# Patient Record
Sex: FEMALE | Race: WHITE | NOT HISPANIC OR LATINO | Employment: OTHER | URBAN - METROPOLITAN AREA
[De-identification: names, ages, dates, MRNs, and addresses within clinical notes are randomized per-mention and may not be internally consistent; named-entity substitution may affect disease eponyms.]

---

## 2019-03-01 ENCOUNTER — APPOINTMENT (OUTPATIENT)
Dept: RADIOLOGY | Facility: MEDICAL CENTER | Age: 74
DRG: 871 | End: 2019-03-01
Attending: STUDENT IN AN ORGANIZED HEALTH CARE EDUCATION/TRAINING PROGRAM
Payer: COMMERCIAL

## 2019-03-01 ENCOUNTER — HOSPITAL ENCOUNTER (INPATIENT)
Facility: MEDICAL CENTER | Age: 74
LOS: 3 days | DRG: 871 | End: 2019-03-04
Attending: EMERGENCY MEDICINE | Admitting: INTERNAL MEDICINE
Payer: COMMERCIAL

## 2019-03-01 ENCOUNTER — HOSPITAL ENCOUNTER (OUTPATIENT)
Dept: RADIOLOGY | Facility: MEDICAL CENTER | Age: 74
End: 2019-03-01

## 2019-03-01 DIAGNOSIS — R19.7 NAUSEA VOMITING AND DIARRHEA: ICD-10-CM

## 2019-03-01 DIAGNOSIS — R11.2 NAUSEA VOMITING AND DIARRHEA: ICD-10-CM

## 2019-03-01 DIAGNOSIS — D72.829 LEUKOCYTOSIS, UNSPECIFIED TYPE: ICD-10-CM

## 2019-03-01 PROBLEM — I10 HYPERTENSION: Status: ACTIVE | Noted: 2019-03-01

## 2019-03-01 PROBLEM — F31.9 BIPOLAR DISORDER (HCC): Status: ACTIVE | Noted: 2019-03-01

## 2019-03-01 PROBLEM — N17.9 ACUTE KIDNEY INJURY (HCC): Status: ACTIVE | Noted: 2019-03-01

## 2019-03-01 PROBLEM — E03.9 HYPOTHYROIDISM: Status: ACTIVE | Noted: 2019-03-01

## 2019-03-01 PROBLEM — C80.1 MALIGNANCY (HCC): Status: ACTIVE | Noted: 2019-03-01

## 2019-03-01 PROBLEM — K52.839 MICROSCOPIC COLITIS: Status: ACTIVE | Noted: 2019-03-01

## 2019-03-01 PROBLEM — E83.52 HYPERCALCEMIA: Status: ACTIVE | Noted: 2019-03-01

## 2019-03-01 PROBLEM — J18.9 PNEUMONIA: Status: ACTIVE | Noted: 2019-03-01

## 2019-03-01 LAB
ALBUMIN SERPL BCP-MCNC: 2.8 G/DL (ref 3.2–4.9)
ALBUMIN/GLOB SERPL: 1.6 G/DL
ALP SERPL-CCNC: 67 U/L (ref 30–99)
ALT SERPL-CCNC: 11 U/L (ref 2–50)
ANION GAP SERPL CALC-SCNC: 8 MMOL/L (ref 0–11.9)
AST SERPL-CCNC: 38 U/L (ref 12–45)
BILIRUB SERPL-MCNC: 1.3 MG/DL (ref 0.1–1.5)
BUN SERPL-MCNC: 17 MG/DL (ref 8–22)
CALCIUM SERPL-MCNC: 7.3 MG/DL (ref 8.5–10.5)
CHLORIDE SERPL-SCNC: 120 MMOL/L (ref 96–112)
CO2 SERPL-SCNC: 10 MMOL/L (ref 20–33)
CREAT SERPL-MCNC: 0.75 MG/DL (ref 0.5–1.4)
GLOBULIN SER CALC-MCNC: 1.7 G/DL (ref 1.9–3.5)
GLUCOSE SERPL-MCNC: 101 MG/DL (ref 65–99)
LACTATE BLD-SCNC: 1.3 MMOL/L (ref 0.5–2)
POTASSIUM SERPL-SCNC: 3.2 MMOL/L (ref 3.6–5.5)
PROT SERPL-MCNC: 4.5 G/DL (ref 6–8.2)
SODIUM SERPL-SCNC: 138 MMOL/L (ref 135–145)

## 2019-03-01 PROCEDURE — 87070 CULTURE OTHR SPECIMN AEROBIC: CPT

## 2019-03-01 PROCEDURE — 36415 COLL VENOUS BLD VENIPUNCTURE: CPT

## 2019-03-01 PROCEDURE — 80053 COMPREHEN METABOLIC PANEL: CPT

## 2019-03-01 PROCEDURE — 700105 HCHG RX REV CODE 258: Performed by: STUDENT IN AN ORGANIZED HEALTH CARE EDUCATION/TRAINING PROGRAM

## 2019-03-01 PROCEDURE — 99285 EMERGENCY DEPT VISIT HI MDM: CPT

## 2019-03-01 PROCEDURE — 85730 THROMBOPLASTIN TIME PARTIAL: CPT

## 2019-03-01 PROCEDURE — A9270 NON-COVERED ITEM OR SERVICE: HCPCS | Performed by: STUDENT IN AN ORGANIZED HEALTH CARE EDUCATION/TRAINING PROGRAM

## 2019-03-01 PROCEDURE — 85610 PROTHROMBIN TIME: CPT

## 2019-03-01 PROCEDURE — 770020 HCHG ROOM/CARE - TELE (206)

## 2019-03-01 PROCEDURE — 87205 SMEAR GRAM STAIN: CPT

## 2019-03-01 PROCEDURE — A9270 NON-COVERED ITEM OR SERVICE: HCPCS | Performed by: EMERGENCY MEDICINE

## 2019-03-01 PROCEDURE — 700102 HCHG RX REV CODE 250 W/ 637 OVERRIDE(OP): Performed by: EMERGENCY MEDICINE

## 2019-03-01 PROCEDURE — 700102 HCHG RX REV CODE 250 W/ 637 OVERRIDE(OP): Performed by: STUDENT IN AN ORGANIZED HEALTH CARE EDUCATION/TRAINING PROGRAM

## 2019-03-01 PROCEDURE — 83605 ASSAY OF LACTIC ACID: CPT | Mod: 91

## 2019-03-01 PROCEDURE — 71250 CT THORAX DX C-: CPT

## 2019-03-01 RX ORDER — HYDRALAZINE HYDROCHLORIDE 20 MG/ML
10 INJECTION INTRAMUSCULAR; INTRAVENOUS EVERY 4 HOURS PRN
Status: DISCONTINUED | OUTPATIENT
Start: 2019-03-01 | End: 2019-03-04 | Stop reason: HOSPADM

## 2019-03-01 RX ORDER — BISACODYL 10 MG
10 SUPPOSITORY, RECTAL RECTAL
Status: DISCONTINUED | OUTPATIENT
Start: 2019-03-01 | End: 2019-03-04 | Stop reason: HOSPADM

## 2019-03-01 RX ORDER — SODIUM CHLORIDE 9 MG/ML
30 INJECTION, SOLUTION INTRAVENOUS
Status: DISCONTINUED | OUTPATIENT
Start: 2019-03-01 | End: 2019-03-04 | Stop reason: HOSPADM

## 2019-03-01 RX ORDER — PREGABALIN 75 MG/1
75 CAPSULE ORAL 2 TIMES DAILY
Status: DISCONTINUED | OUTPATIENT
Start: 2019-03-01 | End: 2019-03-04 | Stop reason: HOSPADM

## 2019-03-01 RX ORDER — AMOXICILLIN 250 MG
2 CAPSULE ORAL 2 TIMES DAILY
Status: DISCONTINUED | OUTPATIENT
Start: 2019-03-01 | End: 2019-03-04 | Stop reason: HOSPADM

## 2019-03-01 RX ORDER — DIPHENOXYLATE HYDROCHLORIDE AND ATROPINE SULFATE 2.5; .025 MG/1; MG/1
1 TABLET ORAL 2 TIMES DAILY
COMMUNITY

## 2019-03-01 RX ORDER — QUETIAPINE FUMARATE 25 MG/1
12.5 TABLET, FILM COATED ORAL DAILY
COMMUNITY

## 2019-03-01 RX ORDER — ZOLPIDEM TARTRATE 5 MG/1
10 TABLET ORAL NIGHTLY PRN
Status: DISCONTINUED | OUTPATIENT
Start: 2019-03-01 | End: 2019-03-04 | Stop reason: HOSPADM

## 2019-03-01 RX ORDER — LAMOTRIGINE 200 MG/1
200 TABLET ORAL 2 TIMES DAILY
COMMUNITY

## 2019-03-01 RX ORDER — LITHIUM CARBONATE 450 MG
450 TABLET, EXTENDED RELEASE ORAL
Status: DISCONTINUED | OUTPATIENT
Start: 2019-03-01 | End: 2019-03-04 | Stop reason: HOSPADM

## 2019-03-01 RX ORDER — LEVOTHYROXINE SODIUM 0.03 MG/1
25 TABLET ORAL
COMMUNITY

## 2019-03-01 RX ORDER — AZITHROMYCIN 250 MG/1
500 TABLET, FILM COATED ORAL DAILY
Status: DISCONTINUED | OUTPATIENT
Start: 2019-03-01 | End: 2019-03-04 | Stop reason: HOSPADM

## 2019-03-01 RX ORDER — SODIUM CHLORIDE 9 MG/ML
500 INJECTION, SOLUTION INTRAVENOUS
Status: DISCONTINUED | OUTPATIENT
Start: 2019-03-01 | End: 2019-03-04 | Stop reason: HOSPADM

## 2019-03-01 RX ORDER — LITHIUM CARBONATE 450 MG
450 TABLET, EXTENDED RELEASE ORAL
COMMUNITY

## 2019-03-01 RX ORDER — ACETAMINOPHEN 325 MG/1
650 TABLET ORAL ONCE
Status: COMPLETED | OUTPATIENT
Start: 2019-03-01 | End: 2019-03-01

## 2019-03-01 RX ORDER — LEVOTHYROXINE SODIUM 0.03 MG/1
25 TABLET ORAL
Status: DISCONTINUED | OUTPATIENT
Start: 2019-03-02 | End: 2019-03-04 | Stop reason: HOSPADM

## 2019-03-01 RX ORDER — POLYETHYLENE GLYCOL 3350 17 G/17G
1 POWDER, FOR SOLUTION ORAL
Status: DISCONTINUED | OUTPATIENT
Start: 2019-03-01 | End: 2019-03-04 | Stop reason: HOSPADM

## 2019-03-01 RX ORDER — LAMOTRIGINE 100 MG/1
200 TABLET ORAL 2 TIMES DAILY
Status: DISCONTINUED | OUTPATIENT
Start: 2019-03-01 | End: 2019-03-04 | Stop reason: HOSPADM

## 2019-03-01 RX ORDER — ZOLPIDEM TARTRATE 12.5 MG/1
12.5 TABLET, FILM COATED, EXTENDED RELEASE ORAL NIGHTLY PRN
COMMUNITY

## 2019-03-01 RX ORDER — PREGABALIN 75 MG/1
75 CAPSULE ORAL 2 TIMES DAILY
COMMUNITY

## 2019-03-01 RX ORDER — GUAIFENESIN/DEXTROMETHORPHAN 100-10MG/5
10 SYRUP ORAL EVERY 6 HOURS PRN
Status: DISCONTINUED | OUTPATIENT
Start: 2019-03-01 | End: 2019-03-03

## 2019-03-01 RX ORDER — CLOBETASOL PROPIONATE 0.05 G/100ML
1 SHAMPOO TOPICAL
COMMUNITY

## 2019-03-01 RX ORDER — SODIUM CHLORIDE 9 MG/ML
INJECTION, SOLUTION INTRAVENOUS CONTINUOUS
Status: DISCONTINUED | OUTPATIENT
Start: 2019-03-01 | End: 2019-03-04

## 2019-03-01 RX ADMIN — AZITHROMYCIN 500 MG: 250 TABLET, FILM COATED ORAL at 21:40

## 2019-03-01 RX ADMIN — LITHIUM CARBONATE 450 MG: 450 TABLET, EXTENDED RELEASE ORAL at 21:00

## 2019-03-01 RX ADMIN — SODIUM CHLORIDE: 9 INJECTION, SOLUTION INTRAVENOUS at 19:45

## 2019-03-01 RX ADMIN — GUAIFENESIN AND DEXTROMETHORPHAN 10 ML: 100; 10 SYRUP ORAL at 21:35

## 2019-03-01 RX ADMIN — PREGABALIN 75 MG: 75 CAPSULE ORAL at 21:40

## 2019-03-01 RX ADMIN — LAMOTRIGINE 200 MG: 100 TABLET ORAL at 21:39

## 2019-03-01 RX ADMIN — ACETAMINOPHEN 650 MG: 325 TABLET, FILM COATED ORAL at 18:37

## 2019-03-01 ASSESSMENT — ENCOUNTER SYMPTOMS
HEARTBURN: 0
WEIGHT LOSS: 0
VOMITING: 1
PSYCHIATRIC NEGATIVE: 1
CARDIOVASCULAR NEGATIVE: 1
CONSTIPATION: 0
MUSCULOSKELETAL NEGATIVE: 1
NAUSEA: 1
FEVER: 0
WEAKNESS: 0
CHILLS: 1
EYES NEGATIVE: 1
BLOOD IN STOOL: 0
DIARRHEA: 1
NEUROLOGICAL NEGATIVE: 1
RESPIRATORY NEGATIVE: 1
DIAPHORESIS: 0
ABDOMINAL PAIN: 1

## 2019-03-01 ASSESSMENT — LIFESTYLE VARIABLES: DO YOU DRINK ALCOHOL: NO

## 2019-03-02 PROBLEM — R21 RASH: Status: ACTIVE | Noted: 2019-03-02

## 2019-03-02 PROBLEM — E87.20 METABOLIC ACIDOSIS: Status: ACTIVE | Noted: 2019-03-02

## 2019-03-02 PROBLEM — R53.81 DEBILITY: Status: ACTIVE | Noted: 2019-03-02

## 2019-03-02 PROBLEM — E87.6 HYPOKALEMIA: Status: ACTIVE | Noted: 2019-03-02

## 2019-03-02 LAB
APPEARANCE UR: CLEAR
APTT PPP: 29.9 SEC (ref 24.7–36)
BACTERIA #/AREA URNS HPF: NEGATIVE /HPF
BASOPHILS # BLD AUTO: 0.2 % (ref 0–1.8)
BASOPHILS # BLD: 0.07 K/UL (ref 0–0.12)
BILIRUB UR QL STRIP.AUTO: NEGATIVE
COLOR UR: YELLOW
EOSINOPHIL # BLD AUTO: 0.23 K/UL (ref 0–0.51)
EOSINOPHIL NFR BLD: 0.8 % (ref 0–6.9)
EPI CELLS #/AREA URNS HPF: ABNORMAL /HPF
ERYTHROCYTE [DISTWIDTH] IN BLOOD BY AUTOMATED COUNT: 45.1 FL (ref 35.9–50)
FLUAV RNA SPEC QL NAA+PROBE: NEGATIVE
FLUBV RNA SPEC QL NAA+PROBE: NEGATIVE
GLUCOSE UR STRIP.AUTO-MCNC: NEGATIVE MG/DL
GRAM STN SPEC: NORMAL
HCT VFR BLD AUTO: 42.1 % (ref 37–47)
HGB BLD-MCNC: 13.4 G/DL (ref 12–16)
HYALINE CASTS #/AREA URNS LPF: ABNORMAL /LPF
IMM GRANULOCYTES # BLD AUTO: 0.22 K/UL (ref 0–0.11)
IMM GRANULOCYTES NFR BLD AUTO: 0.8 % (ref 0–0.9)
INR PPP: 1.49 (ref 0.87–1.13)
KETONES UR STRIP.AUTO-MCNC: NEGATIVE MG/DL
LACTATE BLD-SCNC: 1.6 MMOL/L (ref 0.5–2)
LDH SERPL L TO P-CCNC: 301 U/L (ref 107–266)
LEUKOCYTE ESTERASE UR QL STRIP.AUTO: ABNORMAL
LYMPHOCYTES # BLD AUTO: 2.12 K/UL (ref 1–4.8)
LYMPHOCYTES NFR BLD: 7.5 % (ref 22–41)
MCH RBC QN AUTO: 29.7 PG (ref 27–33)
MCHC RBC AUTO-ENTMCNC: 31.8 G/DL (ref 33.6–35)
MCV RBC AUTO: 93.3 FL (ref 81.4–97.8)
MICRO URNS: ABNORMAL
MONOCYTES # BLD AUTO: 1.12 K/UL (ref 0–0.85)
MONOCYTES NFR BLD AUTO: 4 % (ref 0–13.4)
MRSA DNA SPEC QL NAA+PROBE: NORMAL
NEUTROPHILS # BLD AUTO: 24.49 K/UL (ref 2–7.15)
NEUTROPHILS NFR BLD: 86.7 % (ref 44–72)
NITRITE UR QL STRIP.AUTO: NEGATIVE
NRBC # BLD AUTO: 0 K/UL
NRBC BLD-RTO: 0 /100 WBC
PH UR STRIP.AUTO: 6 [PH]
PLATELET # BLD AUTO: 244 K/UL (ref 164–446)
PMV BLD AUTO: 9.6 FL (ref 9–12.9)
PROT UR QL STRIP: NEGATIVE MG/DL
PROTHROMBIN TIME: 18.1 SEC (ref 12–14.6)
RBC # BLD AUTO: 4.51 M/UL (ref 4.2–5.4)
RBC # URNS HPF: ABNORMAL /HPF
RBC UR QL AUTO: NEGATIVE
SIGNIFICANT IND 70042: NORMAL
SIGNIFICANT IND 70042: NORMAL
SITE SITE: NORMAL
SITE SITE: NORMAL
SOURCE SOURCE: NORMAL
SOURCE SOURCE: NORMAL
SP GR UR STRIP.AUTO: 1.01
UROBILINOGEN UR STRIP.AUTO-MCNC: 0.2 MG/DL
WBC # BLD AUTO: 28.3 K/UL (ref 4.8–10.8)
WBC #/AREA URNS HPF: ABNORMAL /HPF

## 2019-03-02 PROCEDURE — 87502 INFLUENZA DNA AMP PROBE: CPT

## 2019-03-02 PROCEDURE — 83615 LACTATE (LD) (LDH) ENZYME: CPT

## 2019-03-02 PROCEDURE — 700111 HCHG RX REV CODE 636 W/ 250 OVERRIDE (IP): Performed by: STUDENT IN AN ORGANIZED HEALTH CARE EDUCATION/TRAINING PROGRAM

## 2019-03-02 PROCEDURE — 700102 HCHG RX REV CODE 250 W/ 637 OVERRIDE(OP): Performed by: STUDENT IN AN ORGANIZED HEALTH CARE EDUCATION/TRAINING PROGRAM

## 2019-03-02 PROCEDURE — 700105 HCHG RX REV CODE 258: Performed by: STUDENT IN AN ORGANIZED HEALTH CARE EDUCATION/TRAINING PROGRAM

## 2019-03-02 PROCEDURE — 85025 COMPLETE CBC W/AUTO DIFF WBC: CPT

## 2019-03-02 PROCEDURE — 99223 1ST HOSP IP/OBS HIGH 75: CPT | Mod: GC | Performed by: INTERNAL MEDICINE

## 2019-03-02 PROCEDURE — A9270 NON-COVERED ITEM OR SERVICE: HCPCS | Performed by: STUDENT IN AN ORGANIZED HEALTH CARE EDUCATION/TRAINING PROGRAM

## 2019-03-02 PROCEDURE — 87641 MR-STAPH DNA AMP PROBE: CPT

## 2019-03-02 PROCEDURE — 81001 URINALYSIS AUTO W/SCOPE: CPT

## 2019-03-02 PROCEDURE — 770020 HCHG ROOM/CARE - TELE (206)

## 2019-03-02 PROCEDURE — 36415 COLL VENOUS BLD VENIPUNCTURE: CPT

## 2019-03-02 RX ORDER — POTASSIUM CHLORIDE 20 MEQ/1
40 TABLET, EXTENDED RELEASE ORAL 2 TIMES DAILY
Status: COMPLETED | OUTPATIENT
Start: 2019-03-02 | End: 2019-03-04

## 2019-03-02 RX ORDER — CAPSAICIN 0.025 %
CREAM (GRAM) TOPICAL 3 TIMES DAILY PRN
Status: DISCONTINUED | OUTPATIENT
Start: 2019-03-02 | End: 2019-03-04 | Stop reason: HOSPADM

## 2019-03-02 RX ORDER — POTASSIUM CHLORIDE 20 MEQ/1
40 TABLET, EXTENDED RELEASE ORAL 2 TIMES DAILY
Status: DISCONTINUED | OUTPATIENT
Start: 2019-03-02 | End: 2019-03-02

## 2019-03-02 RX ORDER — LOPERAMIDE HYDROCHLORIDE 2 MG/1
2 CAPSULE ORAL 4 TIMES DAILY PRN
Status: DISCONTINUED | OUTPATIENT
Start: 2019-03-02 | End: 2019-03-04 | Stop reason: HOSPADM

## 2019-03-02 RX ADMIN — LITHIUM CARBONATE 450 MG: 450 TABLET, EXTENDED RELEASE ORAL at 21:00

## 2019-03-02 RX ADMIN — LAMOTRIGINE 200 MG: 100 TABLET ORAL at 06:30

## 2019-03-02 RX ADMIN — LAMOTRIGINE 200 MG: 100 TABLET ORAL at 17:52

## 2019-03-02 RX ADMIN — AZITHROMYCIN 500 MG: 250 TABLET, FILM COATED ORAL at 17:52

## 2019-03-02 RX ADMIN — GUAIFENESIN AND DEXTROMETHORPHAN 10 ML: 100; 10 SYRUP ORAL at 20:35

## 2019-03-02 RX ADMIN — GUAIFENESIN AND DEXTROMETHORPHAN 10 ML: 100; 10 SYRUP ORAL at 13:01

## 2019-03-02 RX ADMIN — SODIUM CHLORIDE: 9 INJECTION, SOLUTION INTRAVENOUS at 18:01

## 2019-03-02 RX ADMIN — CAPSAICIN: 0.25 CREAM TOPICAL at 22:25

## 2019-03-02 RX ADMIN — LEVOTHYROXINE SODIUM 25 MCG: 25 TABLET ORAL at 06:30

## 2019-03-02 RX ADMIN — GUAIFENESIN AND DEXTROMETHORPHAN 10 ML: 100; 10 SYRUP ORAL at 06:31

## 2019-03-02 RX ADMIN — PREGABALIN 75 MG: 75 CAPSULE ORAL at 17:52

## 2019-03-02 RX ADMIN — ENOXAPARIN SODIUM 40 MG: 100 INJECTION SUBCUTANEOUS at 17:52

## 2019-03-02 RX ADMIN — CHOLECALCIFEROL (VITAMIN D3) 10 MCG (400 UNIT) TABLET 400 UNITS: at 07:52

## 2019-03-02 RX ADMIN — POTASSIUM CHLORIDE 40 MEQ: 1500 TABLET, EXTENDED RELEASE ORAL at 17:52

## 2019-03-02 RX ADMIN — SODIUM CHLORIDE: 9 INJECTION, SOLUTION INTRAVENOUS at 06:35

## 2019-03-02 RX ADMIN — CEFTRIAXONE SODIUM 1 G: 1 INJECTION, POWDER, FOR SOLUTION INTRAMUSCULAR; INTRAVENOUS at 06:32

## 2019-03-02 RX ADMIN — PREGABALIN 75 MG: 75 CAPSULE ORAL at 06:31

## 2019-03-02 ASSESSMENT — ENCOUNTER SYMPTOMS
FEVER: 0
PSYCHIATRIC NEGATIVE: 1
SPUTUM PRODUCTION: 1
WEAKNESS: 1
VOMITING: 0
HEMOPTYSIS: 0
CHILLS: 0
NAUSEA: 0
MUSCULOSKELETAL NEGATIVE: 1
CONSTIPATION: 0
WEIGHT LOSS: 0
HEARTBURN: 0
CARDIOVASCULAR NEGATIVE: 1
WHEEZING: 0
SHORTNESS OF BREATH: 1
BLOOD IN STOOL: 0
DIAPHORESIS: 0
DIARRHEA: 1
COUGH: 1

## 2019-03-02 ASSESSMENT — COGNITIVE AND FUNCTIONAL STATUS - GENERAL
MOBILITY SCORE: 24
SUGGESTED CMS G CODE MODIFIER DAILY ACTIVITY: CH
SUGGESTED CMS G CODE MODIFIER MOBILITY: CH
DAILY ACTIVITIY SCORE: 24

## 2019-03-02 ASSESSMENT — PATIENT HEALTH QUESTIONNAIRE - PHQ9
SUM OF ALL RESPONSES TO PHQ9 QUESTIONS 1 AND 2: 0
1. LITTLE INTEREST OR PLEASURE IN DOING THINGS: NOT AT ALL
2. FEELING DOWN, DEPRESSED, IRRITABLE, OR HOPELESS: NOT AT ALL

## 2019-03-02 ASSESSMENT — LIFESTYLE VARIABLES: EVER_SMOKED: NEVER

## 2019-03-02 NOTE — CARE PLAN
Problem: Knowledge Deficit  Goal: Knowledge of disease process/condition, treatment plan, diagnostic tests, and medications will improve    Intervention: Explain information regarding disease process/condition, treatment plan, diagnostic tests, and medications and document in education  Pt educated to current POC and verbalized understanding.       Problem: Respiratory:  Goal: Respiratory status will improve    Intervention: Administer and titrate oxygen therapy  2L. Base line room air.  Intervention: Educate and encourage coughing and deep breathing  Complete

## 2019-03-02 NOTE — ASSESSMENT & PLAN NOTE
Patient with hx of Microscopic colitis  C.difficile negative  ( [facility] )  Digital rectal exam at bedside negative for occult blood , FOBT -ve  On discharge, diarrhea at baseline  Stool studies pending

## 2019-03-02 NOTE — ED PROVIDER NOTES
"ED Provider Note    Scribed for Matt Langston M.D. by Andrey Lobo. 3/1/2019  5:29 PM    Primary care provider: None noted  Means of arrival: EMS  History obtained from: Patient  History limited by: Poor historian.    CHIEF COMPLAINT  Chief Complaint   Patient presents with   • Nausea/Vomiting/Diarrhea       HPI  Sherine Hahn is a 73 y.o. female who presents to the Emergency Department complaining of diarrhea. Patient states she was feeling generally \"flu-like\" with \"horribly runny\" diarrhea. Per nursing note, patient reported having nausea, vomiting and diarrhea over the last week. In contrast to nursing note, however, patient denies having any abdominal pain or vomiting. She states she is primarily having issues with diarrhea. She reports having intermittent chills, sweats, slight nausea, and a cough. Per nursing note, patient was found to have pneumonia and hiatal hernia via xray. Patient had CT showing neoplasm malignant to bone. She had temperature of 102 °F at the transfer facility and was treated with vanco and rocephin. She had concerning labs and was sent here for further evaluation. Patient notes her  came back from [place] recently. He goes there often. Patient does not think he has had diarrhea lately.    History is limited secondary to patient being a poor historian.      REVIEW OF SYSTEMS  Pertinent negatives include no abdominal pain, vomiting.   See HPI for further details.     History is limited secondary to patient being a poor historian.    PAST MEDICAL HISTORY   Patient reports hx of [facility]    SURGICAL HISTORY  patient denies any surgical history    SOCIAL HISTORY  Social History   Substance Use Topics   • Smoking status: Never Smoker   • Smokeless tobacco: Never Used      History   Drug use: Unknown       FAMILY HISTORY  No pertinent family history reported.     CURRENT MEDICATIONS  Reviewed.  See Encounter Summary.     ALLERGIES  Allergies   Allergen Reactions   • Seroquel " "[Quetiapine Fumarate]    • Spiriva        PHYSICAL EXAM  VITAL SIGNS: /65   Pulse 100   Temp (!) 38.2 °C (100.8 °F) (Temporal)   Resp 16   Ht 1.778 m (5' 10\")   Wt 77.1 kg (170 lb)   SpO2 94%   BMI 24.39 kg/m²   Constitutional: Well developed, Well nourished, No acute distress, Non-toxic appearance. Appears pale and weak  HENT: Normocephalic, Atraumatic, Bilateral external ears normal, Dry mucus membranes. No oral exudates or nasal discharge.   Eyes: Pupils are equal round and reactive, EOMI, Conjunctiva normal, No discharge.   Neck: Normal range of motion, No tenderness, Supple, No stridor. No meningismus.  Lymphatic: No lymphadenopathy noted.   Cardiovascular: Regular rate and rhythm without murmur rub or gallop.  Thorax & Lungs: Clear breath sounds bilaterally without wheezes, rhonchi or rales. There is no chest wall tenderness.   Abdomen: Soft non-tender non-distended. There is no rebound or guarding. No organomegaly is appreciated. Hyperactive bowel sounds.  Skin: Normal without rash.   Back: No CVA or spinal tenderness.   Extremities: Intact distal pulses, No edema, No tenderness, No cyanosis, No clubbing. Capillary refill is less than 2 seconds.  Musculoskeletal: Good range of motion in all major joints. No tenderness to palpation or major deformities noted.   Neurologic: Alert & oriented x 3, Normal motor function, Normal sensory function, No focal deficits noted. Reflexes are normal.  Psychiatric: Affect normal, Judgment normal, Mood normal. There is no suicidal ideation or patient reported hallucinations.       DIAGNOSTIC STUDIES / PROCEDURES    LABS  Labs Reviewed   LACTIC ACID   CBC WITH DIFFERENTIAL   COMP METABOLIC PANEL   BLOOD CULTURE      All labs reviewed by me.    EKG from Transfer facility at 9:47 AM  Showed normal sinus rhythm at 93, no acute ischemic changes.    RADIOLOGY  OUTSIDE IMAGES-CT ABDOMEN /PELVIS   Final Result      OUTSIDE IMAGES-DX CHEST   Final Result        The " radiologist's interpretation of all radiological studies have been reviewed by me.  I reviewed these images from outside facility which show evidence of metastatic disease to bone and possibly multiple myeloma.    COURSE & MEDICAL DECISION MAKING  Nursing notes, VS, PMSFHx reviewed in chart.    Obtained and reviewed transfer medical records from [place]. Given patient's symptoms, they did a CT which showed hepatosplenomegaly, diffuse osseous metastatic disease, there was a 2 cm round opacity on chest xray of right mid lung zone. Labs showed WBC 38,000, temperature of 102 °F. Blood cultures were done. She was given vanco and rocephin. C diff test was negative. Lactic was 1.7. Troponin was normal. EKG prior arrival at 9:47 AM showed normal sinus rhythm at 93, no acute ischemic changes.    5:29 PM Patient seen and examined at bedside.  After extensive review of her labs and imaging prior to arrival she appears to have evidence of metastatic bone cancer, pulse possible multiple myeloma.  Ordered for labs to evaluate. Will contact [facility]. Patient understands and agrees to plan.  She will need more extensive testing.  She is being treated for sepsis of unclear source at this time but likely GI source given her diarrhea.    5:56 PM - I discussed the patient's case and the above findings with [facility] who will admit the patient.  Patient understands her plan of care admitted in guarded condition    DISPOSITION:  Patient will be admitted to [facility] in guarded condition.     FINAL IMPRESSION  1. Nausea vomiting and diarrhea    2. Leukocytosis, unspecified type    3. Metastatic bone cancer (HCC)          Andrey BRAUN (Malka), am scribing for, and in the presence of, Matt Langston M.D..    Electronically signed by: Andrey Lobo ([place]alka), 3/1/2019    Matt BRAUN M.D. personally performed the services described in this documentation, as scribed by Andrey Lobo in my presence, and it is both accurate and  complete. C    The note accurately reflects work and decisions made by me.  Matt Langston  3/1/2019  6:23 PM

## 2019-03-02 NOTE — PROGRESS NOTES
Pt accepted from ED and ambulated from [facility] to bed with no assist. Placed on telemetry monitor and verified SR 86. VSS. Pt updated to current POC and verbalized understanding. Oriented to call system and room. Bed locked in low position with fall precautions in place and call light in reach.

## 2019-03-02 NOTE — ASSESSMENT & PLAN NOTE
History of bipolar disorder on lithium and lamotrigine medication.    Resume home meds   Controlled,

## 2019-03-02 NOTE — ASSESSMENT & PLAN NOTE
Chronic intermittent diarrhea secondary to microscopic colitis   Records from GI consultant Dr.Michele Guillory requested   Stool WBCs negative, follow up on other stool work up   PRN,Loperamide if worsening need to start Budesonide.   Improving in terms of frequency

## 2019-03-02 NOTE — ASSESSMENT & PLAN NOTE
SIRS 3/4 ( WBCs, HR and Tachycardia ) at [facility].    Chest x-ray  revealed 2 cm round opacity in right lung.    CT chest: Multifocal bronchopneumonia with sternal multiple sclerotic lesions.   Blood and sputum cx NGTD, leukocytosis improving, afebrile.  Continue azithromycin to finish a 5 day course and augmentin to finish a 14 day course  Robitussin and tessalon pearls for cough relief.

## 2019-03-02 NOTE — ASSESSMENT & PLAN NOTE
Pruritic rash lower abdomen and both thighs, non painful non itching noticed on exam   Pt reports for years  Platelets count normal.   prn Benadryl for itching while inpatient

## 2019-03-02 NOTE — PROGRESS NOTES
Ultrasound contacted about ordered ultrasound.  Tech stated that if looking for Ca, ultrasound not appropriate and pt would need mammogram.  MD updated on conversation with ultrasound.  MD to follow up.

## 2019-03-02 NOTE — SENIOR ADMIT NOTE
Senior Admission Note    In summary: Sherine Hahn is a 73 y.o. female with past medical history of hypothyroidism, bipolar, microscopic colitis presented to OSH following several days of N/V/D. Pt denied bloody emesis/stools, states symptoms different from typical colitis flares which has been under control for many years. She also noted SOB/Cough and malaise/fatigue. At OSH CBC/CMP showed marked leukocytosis of 38, ASTON, hypercalcemia, hyperbilirubinemia, and mild hypokalemia. C diff was negative and lactate was wnl. CXR showed RLL opacity and recommended chest CT for confirmatory imaging. CT abd showed diffuse bony lesions suspicious for mets plus hepatosplenomegaly and RLL ground glass opacity. Pt was then started on Vanc/C3 and IVF, she was then transferred to [facility] ED for further mgmt. On arrival pt had 3/4 SIRS (HR, WBC, temp). Repeat CMP showed resolution of hypercalcemia, hyperbilirubinemia, and ASTON. Hypokalemia persisted however. CT chest showed developing multifocal bronchopneumonia and groundglass opacities in the left lower and left upper lobes. Redemonstrated numerous sclerotic lesions. She was then admitted for further mgmt of her conditions.    Pertinent physical exam findings:    Cards: RRR, no murmurs  Pulm: CTAB  Abd: Mild diffuse tenderness    Assessment and plan in summary:    1.Sepsis 2/2 PNA   - Demonstrated on CXR and CT chest   - Started on C3 and azithro, already received fluid boluses    - CTM on tele with cardiac monitor    2.ASTON   - Resolved with IVF at OSH   - CTM    3.Malignancy   - Unclear etiology, no clear source on CT A/P or chest   - Protein low, WBC very elevated possibly blood born malignancy?   - Diarrhea but not other signs of carcinoid/VIPoma, likely unrelated    4.Hypokalemia    - Likely 2/2 diarrhea   - Replete appropriately and CTM    5.Diarrhea   - C diff negative, likely viral or possibly related to malignancy or colitis   - IVF and lyte replenishment    - Continue home  mesalamine   - PRN loperamide     For full plan, please see Intern note for details   Saji Samuel M.D.  PGY 2

## 2019-03-02 NOTE — ED TRIAGE NOTES
Pt tx from [place] , pt arrived with n/v  And abdomen pain for 3 days, pt found to have pneumonia and hiatal hernia via xray, pt had CT showing neoplasm-malignant to bone.  Pt fever their 102 here 38.2, pt received tylenol IV, zofran, vanco 1.2g, benedryl, rocephin at tx facility.  Pt WBC 38.4, H/H 16.2, Hematocrit 5.68.  Pt was also checked for Cdiff which was negative.  Pt does have rash to upper chest and face for a few years-has been checked out by dermatology and per pt found nothing.

## 2019-03-02 NOTE — H&P
Internal Medicine Admitting History and Physical    Note Author: Gladys Mesa M.D.       Name Sherine Hahn 1945   Age/Sex 73 y.o. female   MRN [id number]   Code Status DNR / DNI     After 5PM or if no immediate response to page, please call for cross-coverage  Attending/Team: Dr Alexander See Patient List for primary contact information  Call [phone] to page    1st Call - Day Intern (R1):   Dr Hogan 2nd Call - Day Sr. Resident (R2/R3):   Dr Fitzgerald       Chief Complaint:   States/nausea vomiting/diarrhea and flulike symptoms for 1 week    HPI:    Patient is a 73-year-old female, with a past medical history of hypothyroidism, anxiety, microscopic colitis, osteopenia, bipolar disorder, who presents to the emergency department with complaint of nausea vomiting diarrhea for the past week.  Patient states that she was told that further testing needs to be performed for her cancer that was discovered due to her diarrhea.  She states she initially went to [facility] for a bad case of flulike symptoms, was experiencing horrible watery runny diarrhea, states intermittent cough with some shortness of breath, denies chest pain, congestion, sore throat, dysuria, states abdominal discomfort.  She does states she has a history of chronic diarrhea due to microscopic colitis however this case of diarrhea has been different.    In terms of family history, patient states her mother had some spinal disease with osteoporosis, father had a kidney disease, denies other history of cancers however states spinal disease did run in the family.  In terms of social history, patient states she smoked for about 5 years 40 years ago, denies any alcohol use, denies any illicit drug use.    Per chart review patient presented to outside hospital in [place] earlier on 3/1/201, meeting sepsis criteria SIRS 3 out of 4, where CT abdomen pelvis revealed metastatic cancer with unknown primary source, demonstrating  neoplasm malignancy bone, diffuse osseous metastatic disease, patient had temperature 102, white blood cell count 38,000, C. difficile was negative, elevated lactic acid level, troponins within normal limits, EKG unremarkable, chest x-ray also revealed a 2 cm round opacity in the right lung.  Patient was treated with Vanco and Rocephin and also given 2 L normal saline boluses.  Patient was then transferred to [facility] emergency department for further workup and evaluation.    In the [facility] emergency department, patient mildly febrile, tachycardic, resting comfortably no acute distress.  Digital rectal exam performed at bedside revealed normal rectal tone, no external or internal hemorrhoids or masses, occult blood negative however fecal occult from stool sample will be obtained given that occult was positive for blood at previous facility. Patient to be admitted to medical telemetry floor for unclear source of sepsis but highly likely GI source given diarrhea, also workup for her unclear malignancy.         Review of Systems   Constitutional: Positive for chills. Negative for diaphoresis, fever, malaise/fatigue and weight loss.   HENT: Negative.    Eyes: Negative.    Respiratory: Negative.    Cardiovascular: Negative.    Gastrointestinal: Positive for abdominal pain, diarrhea, nausea and vomiting. Negative for blood in stool, constipation, heartburn and melena.   Genitourinary: Negative.    Musculoskeletal: Negative.    Skin: Negative.    Neurological: Negative.  Negative for weakness.   Endo/Heme/Allergies: Negative.    Psychiatric/Behavioral: Negative.    All other systems reviewed and are negative.            Past Medical History (Chronic medical problem, known complications and current treatment)    Past Medical History:   Diagnosis Date   • Anxiety    • Bipolar disorder (HCC)    • Hypothyroidism    • Microscopic colitis    • Osteopenia          Past Surgical History:  History reviewed. No pertinent surgical  history.    Current Outpatient Medications:  Home Medications     Reviewed by Samy Metzger, PharmD (Pharmacist) on 03/01/19 at 2000  Med List Status: Complete   Medication Last Dose Status   Cholecalciferol (VITAMIN D PO) 2/27/2019 Active   Clobetasol Propionate 0.05 % Shampoo >1week Active   diphenoxylate-atropine (LOMOTIL) 2.5-0.025 MG Tab >1week Active   IRON PO 2/27/2019 Active   lamotrigine (LAMICTAL) 200 MG tablet 2/27/2019 Active   levothyroxine (SYNTHROID) 25 MCG Tab 2/27/2019 Active   lithium CR (ESKALITH CR) 450 MG Tab CR 2/27/2019 Active   Multiple Vitamins-Minerals (MULTIVITAMIN PO) 2/27/2019 Active   pregabalin (LYRICA) 75 MG Cap 2/27/2019 Active   QUEtiapine (SEROQUEL) 25 MG Tab 2/27/2019 Active   VITAMIN E PO 2/27/2019 Active   zolpidem (AMBIEN CR) 12.5 MG CR tablet 2/27/2019 Active                Medication Allergy/Sensitivities:  Allergies   Allergen Reactions   • Quetiapine Rash     Only in 25 mg doses or higher    • Tiotropium Unspecified     Pt can not remember reaction         Family History (mandatory)   Family History   Problem Relation Age of Onset   • Kidney Disease Father    • No Known Problems Sister    • No Known Problems Brother    • No Known Problems Maternal Grandmother    • No Known Problems Maternal Grandfather    • No Known Problems Paternal Grandmother    • No Known Problems Paternal Grandfather        Social History (mandatory)   Social History     Social History   • Marital status:      Spouse name: N/A   • Number of children: N/A   • Years of education: N/A     Occupational History   • Not on file.     Social History Main Topics   • Smoking status: Former Smoker     Years: 5.00   • Smokeless tobacco: Never Used      Comment: Smoked 40 years ago   • Alcohol use No   • Drug use: No   • Sexual activity: Not on file     Other Topics Concern   • Not on file     Social History Narrative   • No narrative on file     Living situation: Home with   PCP : No primary care  "provider on file.    Physical Exam     Vitals:    03/01/19 1837 03/01/19 1900 03/01/19 2036 03/02/19 0000   BP: (!) 112/37   120/50   Pulse: 90 88 82 85   Resp: 16 19 19 18   Temp: 37 °C (98.6 °F)   36.7 °C (98 °F)   TempSrc: Temporal   Temporal   SpO2:  94% 97% 94%   Weight:       Height:         Body mass index is 24.39 kg/m².  /50   Pulse 85   Temp 36.7 °C (98 °F) (Temporal)   Resp 18   Ht 1.778 m (5' 10\")   Wt 77.1 kg (170 lb)   SpO2 94%   BMI 24.39 kg/m²   O2 therapy: Pulse Oximetry: 94 %, O2 Delivery: Silicone Nasal Cannula    Physical Exam   Constitutional: She is oriented to person, place, and time and well-developed, well-nourished, and in no distress. No distress.   HENT:   Head: Normocephalic and atraumatic.   Right Ear: External ear normal.   Left Ear: External ear normal.   Nose: Nose normal.   Mouth/Throat: Oropharynx is clear and moist. No oropharyngeal exudate.   Eyes: Pupils are equal, round, and reactive to light. Conjunctivae and EOM are normal. Right eye exhibits no discharge. Left eye exhibits no discharge. No scleral icterus.   Neck: Normal range of motion. Neck supple. No JVD present. No tracheal deviation present. No thyromegaly present.   Cardiovascular: Normal rate, regular rhythm, normal heart sounds and intact distal pulses.  Exam reveals no gallop and no friction rub.    No murmur heard.  Pulmonary/Chest: Effort normal and breath sounds normal. No stridor. No respiratory distress. She has no wheezes. She has no rales. She exhibits no tenderness.   Abdominal: Soft. Bowel sounds are normal. She exhibits no distension and no mass. There is tenderness (Diffuse abdominal tenderness to palpation). There is no rebound and no guarding.   Genitourinary:   Genitourinary Comments: Digital rectal exam was performed at bedside by Dr. Samuel with Dr. Mesa at bedside, normal rectal tone, no internal/external hemorrhoids or masses appreciated, mild tenderness palpation, occult blood " negative.   Musculoskeletal: Normal range of motion. She exhibits no edema, tenderness or deformity.   Distal peripheral pulses 2+ bilaterally in all extremities.   Lymphadenopathy:     She has no cervical adenopathy.   Neurological: She is alert and oriented to person, place, and time. She has normal reflexes. She displays normal reflexes. No cranial nerve deficit. She exhibits normal muscle tone. Gait normal. Coordination normal. GCS score is 15.   Skin: Skin is warm. No rash noted. She is not diaphoretic. No erythema.   Psychiatric: Mood, memory, affect and judgment normal.   Nursing note and vitals reviewed.        Data Review       Old Records Request:   Completed  Current Records review/summary: Completed    Lab Data Review:  Recent Results (from the past 24 hour(s))   LACTIC ACID    Collection Time: 03/01/19  5:50 PM   Result Value Ref Range    Lactic Acid 1.3 0.5 - 2.0 mmol/L   Comp Metabolic Panel    Collection Time: 03/01/19  7:00 PM   Result Value Ref Range    Sodium 138 135 - 145 mmol/L    Potassium 3.2 (L) 3.6 - 5.5 mmol/L    Chloride 120 (H) 96 - 112 mmol/L    Co2 10 (L) 20 - 33 mmol/L    Anion Gap 8.0 0.0 - 11.9    Glucose 101 (H) 65 - 99 mg/dL    Bun 17 8 - 22 mg/dL    Creatinine 0.75 0.50 - 1.40 mg/dL    Calcium 7.3 (L) 8.5 - 10.5 mg/dL    AST(SGOT) 38 12 - 45 U/L    ALT(SGPT) 11 2 - 50 U/L    Alkaline Phosphatase 67 30 - 99 U/L    Total Bilirubin 1.3 0.1 - 1.5 mg/dL    Albumin 2.8 (L) 3.2 - 4.9 g/dL    Total Protein 4.5 (L) 6.0 - 8.2 g/dL    Globulin 1.7 (L) 1.9 - 3.5 g/dL    A-G Ratio 1.6 g/dL   ESTIMATED GFR    Collection Time: 03/01/19  7:00 PM   Result Value Ref Range    GFR If African American >60 >60 mL/min/1.73 m 2    GFR If Non African American >60 >60 mL/min/1.73 m 2   Prothrombin time (INR)    Collection Time: 03/01/19 11:45 PM   Result Value Ref Range    PT 18.1 (H) 12.0 - 14.6 sec    INR 1.49 (H) 0.87 - 1.13   APTT    Collection Time: 03/01/19 11:45 PM   Result Value Ref Range    APTT  29.9 24.7 - 36.0 sec   Lactic Acid -STAT Once    Collection Time: 03/01/19 11:45 PM   Result Value Ref Range    Lactic Acid 1.6 0.5 - 2.0 mmol/L       Imaging/Procedures Review:    Independant Imaging Review: Completed  CT-CHEST (THORAX) W/O   Final Result      1. Findings are consistent with bronchitis and developing multifocal bronchopneumonia. No pleural effusions.      2. Groundglass opacities in the left lower and left upper lobes may also be related to active infection. Follow-up CT is advised in 2-3 months to document resolution.      3. Numerous sclerotic lesions throughout the visualized osseous structures, highly suspicious for osseous metastatic disease.      4. Moderate to large hiatal hernia.      5. Splenomegaly.      6. Subcentimeter right hepatic hypodensity, too small to characterize.               OUTSIDE IMAGES-CT ABDOMEN /PELVIS   Final Result      OUTSIDE IMAGES-DX CHEST   Final Result               EKG:   EKG Independent Review: Completed  No results found for this or any previous visit.    Records reviewed and summarized in current documentation :  Yes  [employer] teaching service handout given to patient:  Yes         Assessment/Plan     * Pneumonia   Assessment & Plan    Patient presents with flulike symptoms for 1 week, intermittent cough with some shortness of breath.  Chest x-ray previous facility revealed 2 cm round opacity in right lung.  Patient treated with Vanco and Rocephin before being transferred to [facility] emergency department for further workup.    Plan  - Continue ceftriaxone and azithromycin antibiotics at this time  - Continue to monitor on telemetry with cardiac     Diarrhea   Assessment & Plan    Watery runny diarrhea for past week, has history of chronic diarrhea due to microscopic colitis, however patient states this case of diarrhea has been different.  C. difficile negative in previous facility.  Digital rectal exam at bedside negative for occult blood.    Plan  - Continue IV  fluids and electrolyte replenishment as required  - Continue home mesalamine  - Follow-up fecal occult stool test  - This could represent viral or could possibly be related to malignancy or her chronic microscopic colitis  - PRN loperamide         Hypokalemia   Assessment & Plan    Patient presented with potassium 3.4 on admission.  This likely secondary to diarrhea.    Plan  - Continue to monitor and replete potassium as required     Microscopic colitis   Assessment & Plan    Reports history of microscopic colitis, causing chronic diarrhea.     Acute kidney injury (HCC)   Assessment & Plan    BUN 20, creatinine 1.39 on admission.    Plan  - Continue IV fluids     Malignancy (HCC)   Assessment & Plan    CT abdomen pelvis and previous transfer facility in [place] revealed metastatic cancer with unknown primary source, demonstrated neoplasm malignancy bone, diffuse osseous metastatic disease.  Chest x-ray revealed 2 cm round opacity in right lung.    Plan  - Patient's protein levels are low, white blood cell count elevated at 38.4 could possibly represent blood-borne malignancy  - Day team to consider oncology intervention     Bipolar disorder (HCC)   Assessment & Plan    History of bipolar disorder on lithium and lamotrigine medication.  Continue meds.     Hypothyroidism   Assessment & Plan    Patient has history of hypothyroidism on levothyroxine medication, continue levothyroxine.     Hypercalcemia   Assessment & Plan    Patient's calcium 11.7 on admission, could be likely secondary to malignancy.    Plan  - Continue IV fluids  - Follow-up a.m. labs         Anticipated Hospital stay:  >2 midnights        Quality Measures  Quality-Core Measures   Reviewed items::  Labs reviewed, EKG reviewed, Medications reviewed and Radiology images reviewed  Mcnally catheter::  No Mcnally  DVT prophylaxis - mechanical:  SCDs    PCP: No primary care provider on file.

## 2019-03-02 NOTE — ED NOTES
"Lab called for redraw of green top as first collection \"contaminated\"  Small amount collected, sent to lab  "

## 2019-03-02 NOTE — ED NOTES
Med rec complete per pt at bedside  Allergies have been verified and updated  No oral ABX within the last 30 days  Pt Home Pharmacy:[facility]

## 2019-03-02 NOTE — ASSESSMENT & PLAN NOTE
BUN 20, creatinine 1.39 > 0.8 on admission per [facility] records  Secondary to dehydration which is secondary to diarrhea.   BUN/Creat ratio > 20 ( Pre-renal )  Resolved with IVF.

## 2019-03-02 NOTE — ASSESSMENT & PLAN NOTE
Patient's calcium 11.7 on admission, could be likely secondary to malignancy, Dehydration.   Corrected after IVF

## 2019-03-02 NOTE — PROGRESS NOTES
2 RN skin check complete with Sherry LEDESMA.     Ears red and slow to fuad. Glasses removed and soft silicone tubing applied.  Generalized rash to back. Small flat round dark red to purple lesions. Pt states that she has had it all her adult life. Otherwise skin intact.

## 2019-03-02 NOTE — ASSESSMENT & PLAN NOTE
Patient denies hx of cancer in the past, reports family hx of Non Hodgkin lymphoma with her mother. Also denies poor appetite and no wt changes.   O/E lt breast 2x2 cm mobile mass. No cervical, axially or inguinal lymph nodes appreciated, no thyromegaly or thyroid nodules. TRESA exam no bleeding or masses.   Chest x-ray revealed 2 cm round opacity in right lung.   CT chest and abdomen discussed with our radiologist, there are multiple sternal mets and sacral mets too ( sclerotic lesions )  Peripheral smear no abnormal cells concerning of CML, CLL or MM  US b/l breasts declined by Radiology says can be done as outpatient once patient stable for discharge.  Bone scan shows milld increased uptake within the sternal lesion. This is nonspecific but could relate to metastasis.   D.D of sclerotic bone lesions includes ( Colon cx, breast cx, Tyroid cx, Lymphoma, Iatrogenic, Bone infarcts, hemangioma, bone malignancy, Paget disease ) In her case most likely source is breast given lt breast mass 2x2 soft mobile mass.   Patient was scheduled for a follow-up appointment with her primary care doctor on 3/6/2019.  Instructions were given to patient to get bilateral breast ultrasound and referral from her PCP for [facility] oncology consult.  Colonoscopy reports were requested from [facility] (Dr.Michele Guillory)  which were pending at the time of discharge.

## 2019-03-02 NOTE — PROGRESS NOTES
Internal Medicine Interval Note  Note Author: Rosina Clark M.D.     Name Sherine Hahn 1945   Age/Sex 73 y.o. female   MRN [id number]   Code Status DNAR/DNI     After 5PM or if no immediate response to page, please call for cross-coverage  Attending/Team:  See Patient List for primary contact information  Call [phone] to page    1st Call - Day Intern (R1):   Eduardo  2nd Call - Day Sr. Resident (R2/R3):   Amilcar          Reason for interval visit  (Principal Problem)   Admitted for CAP along with sclerotic sternal and sacral lesions for further work up       Interval Problem Daily Status Update  (24 hours, problem oriented, brief subjective history, new lab/imaging data pertinent to that problem)   - Over night admit. No incidents over night, kidney functions normalizes. US b/l breasts requested. Records from [facility] consultants requested (  ). Continue treatment with c3 and Azithromycin. PT/OT consult.     Review of Systems   Constitutional: Positive for malaise/fatigue. Negative for chills, diaphoresis, fever and weight loss.   HENT: Negative.    Respiratory: Positive for cough, sputum production and shortness of breath. Negative for hemoptysis and wheezing.    Cardiovascular: Negative.    Gastrointestinal: Positive for diarrhea. Negative for blood in stool, constipation, heartburn, melena, nausea and vomiting.   Genitourinary: Negative.    Musculoskeletal: Negative.    Skin: Positive for rash. Negative for itching.   Neurological: Positive for weakness.   Psychiatric/Behavioral: Negative.        Disposition/Barriers to discharge:   Needs PT/OT evaluation, not on oxygen at home.     Consultants/Specialty  None  PCP: No primary care provider on file.      Quality Measures  Quality-Core Measures   Reviewed items::  Labs reviewed  Mcnally catheter::  No Mcnally  DVT prophylaxis pharmacological::  Enoxaparin (Lovenox)          Physical Exam       Vitals:    19  0400 03/02/19 0500 03/02/19 0737 03/02/19 1146   BP: 114/53  118/75 141/67   Pulse: 85  87 90   Resp: 18  17 15   Temp: 37 °C (98.6 °F)  37.5 °C (99.5 °F) 37.8 °C (100.1 °F)   TempSrc: Temporal  Temporal Temporal   SpO2: 93%  93% 97%   Weight:  74.1 kg (163 lb 5.8 oz)     Height:         Body mass index is 23.44 kg/m². Weight: 74.1 kg (163 lb 5.8 oz)  Oxygen Therapy:  Pulse Oximetry: 97 %, O2 (LPM): 0, O2 Delivery: None (Room Air)    Physical Exam   Constitutional: She is oriented to person, place, and time and well-developed, well-nourished, and in no distress.   HENT:   Head: Normocephalic.   Eyes: Pupils are equal, round, and reactive to light.   Neck: Normal range of motion. Neck supple. No JVD present. No thyromegaly present.   Cardiovascular: Normal rate, regular rhythm and normal heart sounds.    Pulmonary/Chest: Effort normal. No respiratory distress. She has no wheezes.   Deminished air entry rt side. No crackles appreciated   Abdominal: Soft. Bowel sounds are normal. She exhibits no distension and no mass. There is tenderness. There is no rebound and no guarding.   Lower abdominal purpuric rash   Musculoskeletal: Normal range of motion.   Lymphadenopathy:     She has no cervical adenopathy.   Neurological: She is alert and oriented to person, place, and time. GCS score is 15.   Skin: Rash noted.   Psychiatric: Affect normal.         Assessment/Plan     * Pneumonia   Assessment & Plan    Patient has been c/o SOB and productive cough. No chills but reported fever at [facility].   SIRS 3/4 ( WBCs, HR and Tachycardia ) at [facility].    Chest exam: deminshed air entry on rt side. Couldn't appreciate added sounds on exam. 97% RA  Chest x-ray previous facility revealed 2 cm round opacity in right lung.    CT chest: Multifocal bronchopneumonia with sternal multiple sclerotic lesions.     Plan  - Continue ceftriaxone and azithromycin antibiotics at this time  - Continue to monitor on telemetry with  cardiac  - Follow up on MRSA, Flu, blood and sputum c/s.   - RT protocol  - Incentive spirometry     Malignancy (HCC)   Assessment & Plan    Patient denies hx of cancer in the past, reports family hx of Non Hodgkin lymphoma with her mother. Also denies poor appetite and no wt changes.   O/E lt breast 2x2 cm mobile mass. No cervical, axially or inguinal lymph nodes appreciated, no thyromegaly or thyroid nodules. TRESA exam no bleeding or masses.    Chest x-ray revealed 2 cm round opacity in right lung.   CT chest and abdomen discussed with our radiologist, there are multiple sternal mets and sacral mets too ( scelerotic lesions )  CBC: Leucocytosis 38,000 > 28.3  D.D of sclerotic bone lesions includes ( Colon cx, breast cx, Tyroid cx, Lymphoma, Iatrogenic, Bone infarcts, hemangioma, bone malignancy, Paget disease ) In her case most likely source is breast given lt breast mass 2x2 soft mobile mass.     Plan  - Records from [facility] requested Dr.Michele Guillory requested ( Colonoscopy )  - US b/l breasts   - Peripheral smear.      Hypokalemia   Assessment & Plan    Patient presented with potassium 3.4 on admission.  This likely secondary to diarrhea.    Plan  - KCL supplements.   - CTM, BMP tomorrow     Microscopic colitis   Assessment & Plan    Chronic intermittent diarrhea secondary to microscopic colitis   Records from GI consultant Dr.Michele Guillory requested   PRN,Loperamide if worsening need to start Budesonide.      Acute kidney injury (HCC)   Assessment & Plan    BUN 20, creatinine 1.39 on admission per [facility] records  Secondary to dehydration secondary to diarrhea.   BUN/Creat ratio > 20   Resolving.     Plan  - Continue IV fluids  - BMP tomrrow     Diarrhea   Assessment & Plan    Patient with hx of Microscopic colitis, reports having episodes of non bloody diarrhea, 2-3 time a day for the last 1-2 weeks. SIRS 3/4 on admission (sepsis secondary to PNA)  On exam, vitally stable a febrile this morning.  Mild dehydration with dry mucus membrane.   C.difficile negative in ( [facility] )  Digital rectal exam at bedside negative for occult blood ( per night team )  CBC: WBCs 28.3, HGB 13.4  D.D: Persistent diarrhea secondary to Gastroentritis ( viral, bacterial ), Microscopic colitis flare up, Colon/Rectal cx vs less likely C.Diff (negative at [facility])  Plan  - Continue IV fluids and electrolyte replenishment.   - Follow up on FOBT, Stool WBCs, C/S and Stool O&P  - PRN loperamide. If continues will add Budesonide.   - Records from [facility] Dr.Michale Guillory requested. ( Colonoscopy records )         Rash   Assessment & Plan    - Pruritic rash lower abdomen and both thighs, non painful non itching noticed on exam   - Pt reports for years  - Platelets count normal.   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   - CTM      Debility   Assessment & Plan    - Debility in the setting of acute hospitalization  - PT/OT      Metabolic acidosis   Assessment & Plan    - Secondary to diarrhea  - IVF  - CMP tomorrow.      Bipolar disorder (HCC)   Assessment & Plan    History of bipolar disorder on lithium and lamotrigine medication.    Resume home meds   Controlled     Hypothyroidism   Assessment & Plan    Patient has history of hypothyroidism on levothyroxine medication  Resume L.Thyroxin   TFT requested     Hypercalcemia   Assessment & Plan    Patient's calcium 11.7 on admission, could be likely secondary to malignancy, Dehydration.   - Corrected after IVF   - CMP tomorrow.

## 2019-03-03 ENCOUNTER — APPOINTMENT (OUTPATIENT)
Dept: RADIOLOGY | Facility: MEDICAL CENTER | Age: 74
DRG: 871 | End: 2019-03-03
Attending: STUDENT IN AN ORGANIZED HEALTH CARE EDUCATION/TRAINING PROGRAM
Payer: COMMERCIAL

## 2019-03-03 LAB
ALBUMIN SERPL BCP-MCNC: 3.3 G/DL (ref 3.2–4.9)
ALBUMIN/GLOB SERPL: 1.4 G/DL
ALP SERPL-CCNC: 113 U/L (ref 30–99)
ALT SERPL-CCNC: 55 U/L (ref 2–50)
ANION GAP SERPL CALC-SCNC: 9 MMOL/L (ref 0–11.9)
AST SERPL-CCNC: 40 U/L (ref 12–45)
BASOPHILS # BLD AUTO: 0.3 % (ref 0–1.8)
BASOPHILS # BLD: 0.05 K/UL (ref 0–0.12)
BILIRUB SERPL-MCNC: 0.6 MG/DL (ref 0.1–1.5)
BUN SERPL-MCNC: 15 MG/DL (ref 8–22)
CALCIUM SERPL-MCNC: 8.9 MG/DL (ref 8.5–10.5)
CHLORIDE SERPL-SCNC: 115 MMOL/L (ref 96–112)
CO2 SERPL-SCNC: 18 MMOL/L (ref 20–33)
CREAT SERPL-MCNC: 0.87 MG/DL (ref 0.5–1.4)
EOSINOPHIL # BLD AUTO: 0.23 K/UL (ref 0–0.51)
EOSINOPHIL NFR BLD: 1.3 % (ref 0–6.9)
ERYTHROCYTE [DISTWIDTH] IN BLOOD BY AUTOMATED COUNT: 43.6 FL (ref 35.9–50)
GLOBULIN SER CALC-MCNC: 2.3 G/DL (ref 1.9–3.5)
GLUCOSE SERPL-MCNC: 111 MG/DL (ref 65–99)
HCT VFR BLD AUTO: 36.6 % (ref 37–47)
HEMOCCULT STL QL: NEGATIVE
HGB BLD-MCNC: 11.7 G/DL (ref 12–16)
IMM GRANULOCYTES # BLD AUTO: 0.13 K/UL (ref 0–0.11)
IMM GRANULOCYTES NFR BLD AUTO: 0.7 % (ref 0–0.9)
LYMPHOCYTES # BLD AUTO: 2.58 K/UL (ref 1–4.8)
LYMPHOCYTES NFR BLD: 14.8 % (ref 22–41)
MAGNESIUM SERPL-MCNC: 2 MG/DL (ref 1.5–2.5)
MCH RBC QN AUTO: 29.8 PG (ref 27–33)
MCHC RBC AUTO-ENTMCNC: 32 G/DL (ref 33.6–35)
MCV RBC AUTO: 93.1 FL (ref 81.4–97.8)
MONOCYTES # BLD AUTO: 0.77 K/UL (ref 0–0.85)
MONOCYTES NFR BLD AUTO: 4.4 % (ref 0–13.4)
NEUTROPHILS # BLD AUTO: 13.7 K/UL (ref 2–7.15)
NEUTROPHILS NFR BLD: 78.5 % (ref 44–72)
NRBC # BLD AUTO: 0 K/UL
NRBC BLD-RTO: 0 /100 WBC
PLATELET # BLD AUTO: 215 K/UL (ref 164–446)
PMV BLD AUTO: 9.9 FL (ref 9–12.9)
POTASSIUM SERPL-SCNC: 3.3 MMOL/L (ref 3.6–5.5)
PROT SERPL-MCNC: 5.6 G/DL (ref 6–8.2)
RBC # BLD AUTO: 3.93 M/UL (ref 4.2–5.4)
SODIUM SERPL-SCNC: 142 MMOL/L (ref 135–145)
T4 FREE SERPL-MCNC: 1.1 NG/DL (ref 0.53–1.43)
TSH SERPL DL<=0.005 MIU/L-ACNC: 1.15 UIU/ML (ref 0.38–5.33)
WBC # BLD AUTO: 17.5 K/UL (ref 4.8–10.8)
WBC STL QL MICRO: NORMAL

## 2019-03-03 PROCEDURE — 87209 SMEAR COMPLEX STAIN: CPT

## 2019-03-03 PROCEDURE — 82272 OCCULT BLD FECES 1-3 TESTS: CPT

## 2019-03-03 PROCEDURE — 700102 HCHG RX REV CODE 250 W/ 637 OVERRIDE(OP): Performed by: STUDENT IN AN ORGANIZED HEALTH CARE EDUCATION/TRAINING PROGRAM

## 2019-03-03 PROCEDURE — A9270 NON-COVERED ITEM OR SERVICE: HCPCS | Performed by: STUDENT IN AN ORGANIZED HEALTH CARE EDUCATION/TRAINING PROGRAM

## 2019-03-03 PROCEDURE — 89055 LEUKOCYTE ASSESSMENT FECAL: CPT

## 2019-03-03 PROCEDURE — 83735 ASSAY OF MAGNESIUM: CPT

## 2019-03-03 PROCEDURE — 84443 ASSAY THYROID STIM HORMONE: CPT

## 2019-03-03 PROCEDURE — 84439 ASSAY OF FREE THYROXINE: CPT

## 2019-03-03 PROCEDURE — 700111 HCHG RX REV CODE 636 W/ 250 OVERRIDE (IP): Performed by: STUDENT IN AN ORGANIZED HEALTH CARE EDUCATION/TRAINING PROGRAM

## 2019-03-03 PROCEDURE — 97162 PT EVAL MOD COMPLEX 30 MIN: CPT

## 2019-03-03 PROCEDURE — 94760 N-INVAS EAR/PLS OXIMETRY 1: CPT

## 2019-03-03 PROCEDURE — A9503 TC99M MEDRONATE: HCPCS

## 2019-03-03 PROCEDURE — 87177 OVA AND PARASITES SMEARS: CPT

## 2019-03-03 PROCEDURE — 85025 COMPLETE CBC W/AUTO DIFF WBC: CPT

## 2019-03-03 PROCEDURE — 36415 COLL VENOUS BLD VENIPUNCTURE: CPT

## 2019-03-03 PROCEDURE — 80053 COMPREHEN METABOLIC PANEL: CPT

## 2019-03-03 PROCEDURE — 700105 HCHG RX REV CODE 258: Performed by: STUDENT IN AN ORGANIZED HEALTH CARE EDUCATION/TRAINING PROGRAM

## 2019-03-03 PROCEDURE — 99233 SBSQ HOSP IP/OBS HIGH 50: CPT | Mod: GC | Performed by: INTERNAL MEDICINE

## 2019-03-03 PROCEDURE — 770020 HCHG ROOM/CARE - TELE (206)

## 2019-03-03 PROCEDURE — 87046 STOOL CULTR AEROBIC BACT EA: CPT

## 2019-03-03 PROCEDURE — 87045 FECES CULTURE AEROBIC BACT: CPT

## 2019-03-03 RX ORDER — GUAIFENESIN/DEXTROMETHORPHAN 100-10MG/5
10 SYRUP ORAL EVERY 4 HOURS PRN
Status: DISCONTINUED | OUTPATIENT
Start: 2019-03-03 | End: 2019-03-04 | Stop reason: HOSPADM

## 2019-03-03 RX ORDER — DIPHENHYDRAMINE HCL 25 MG
25 TABLET ORAL EVERY 6 HOURS PRN
Status: DISCONTINUED | OUTPATIENT
Start: 2019-03-03 | End: 2019-03-04 | Stop reason: HOSPADM

## 2019-03-03 RX ORDER — ACETAMINOPHEN 325 MG/1
650 TABLET ORAL EVERY 6 HOURS PRN
Status: DISCONTINUED | OUTPATIENT
Start: 2019-03-03 | End: 2019-03-04 | Stop reason: HOSPADM

## 2019-03-03 RX ORDER — BENZONATATE 100 MG/1
100 CAPSULE ORAL 3 TIMES DAILY PRN
Status: DISCONTINUED | OUTPATIENT
Start: 2019-03-03 | End: 2019-03-04 | Stop reason: HOSPADM

## 2019-03-03 RX ADMIN — CHOLECALCIFEROL (VITAMIN D3) 10 MCG (400 UNIT) TABLET 400 UNITS: at 06:17

## 2019-03-03 RX ADMIN — LAMOTRIGINE 200 MG: 100 TABLET ORAL at 06:17

## 2019-03-03 RX ADMIN — POTASSIUM CHLORIDE 40 MEQ: 1500 TABLET, EXTENDED RELEASE ORAL at 18:00

## 2019-03-03 RX ADMIN — DIPHENHYDRAMINE HCL 25 MG: 25 TABLET ORAL at 17:08

## 2019-03-03 RX ADMIN — SODIUM CHLORIDE: 9 INJECTION, SOLUTION INTRAVENOUS at 15:44

## 2019-03-03 RX ADMIN — LITHIUM CARBONATE 450 MG: 450 TABLET, EXTENDED RELEASE ORAL at 19:56

## 2019-03-03 RX ADMIN — GUAIFENESIN AND DEXTROMETHORPHAN 10 ML: 100; 10 SYRUP ORAL at 23:56

## 2019-03-03 RX ADMIN — LAMOTRIGINE 200 MG: 100 TABLET ORAL at 18:00

## 2019-03-03 RX ADMIN — SODIUM CHLORIDE: 9 INJECTION, SOLUTION INTRAVENOUS at 03:56

## 2019-03-03 RX ADMIN — PREGABALIN 75 MG: 75 CAPSULE ORAL at 06:17

## 2019-03-03 RX ADMIN — CAPSAICIN: 0.25 CREAM TOPICAL at 21:33

## 2019-03-03 RX ADMIN — GUAIFENESIN AND DEXTROMETHORPHAN 10 ML: 100; 10 SYRUP ORAL at 03:51

## 2019-03-03 RX ADMIN — ENOXAPARIN SODIUM 40 MG: 100 INJECTION SUBCUTANEOUS at 06:18

## 2019-03-03 RX ADMIN — LEVOTHYROXINE SODIUM 25 MCG: 25 TABLET ORAL at 06:18

## 2019-03-03 RX ADMIN — POTASSIUM CHLORIDE 40 MEQ: 1500 TABLET, EXTENDED RELEASE ORAL at 06:17

## 2019-03-03 RX ADMIN — AZITHROMYCIN 500 MG: 250 TABLET, FILM COATED ORAL at 18:00

## 2019-03-03 RX ADMIN — PREGABALIN 75 MG: 75 CAPSULE ORAL at 18:00

## 2019-03-03 RX ADMIN — GUAIFENESIN AND DEXTROMETHORPHAN 10 ML: 100; 10 SYRUP ORAL at 18:24

## 2019-03-03 RX ADMIN — GUAIFENESIN AND DEXTROMETHORPHAN 10 ML: 100; 10 SYRUP ORAL at 11:59

## 2019-03-03 RX ADMIN — BENZONATATE 100 MG: 100 CAPSULE ORAL at 19:56

## 2019-03-03 RX ADMIN — ACETAMINOPHEN 650 MG: 325 TABLET, FILM COATED ORAL at 15:32

## 2019-03-03 RX ADMIN — CEFTRIAXONE SODIUM 1 G: 1 INJECTION, POWDER, FOR SOLUTION INTRAMUSCULAR; INTRAVENOUS at 06:17

## 2019-03-03 RX ADMIN — DIPHENHYDRAMINE HCL 25 MG: 25 TABLET ORAL at 03:52

## 2019-03-03 ASSESSMENT — GAIT ASSESSMENTS
DISTANCE (FEET): 250
DEVIATION: DECREASED BASE OF SUPPORT
GAIT LEVEL OF ASSIST: CONTACT GUARD ASSIST

## 2019-03-03 ASSESSMENT — ENCOUNTER SYMPTOMS
CONSTIPATION: 0
FEVER: 0
VOMITING: 0
CHILLS: 0
MUSCULOSKELETAL NEGATIVE: 1
BLOOD IN STOOL: 0
HEARTBURN: 0
WEIGHT LOSS: 0
NAUSEA: 0
CARDIOVASCULAR NEGATIVE: 1
HEMOPTYSIS: 0
SHORTNESS OF BREATH: 1
DIAPHORESIS: 0
WHEEZING: 0
WEAKNESS: 1
DIARRHEA: 1
SPUTUM PRODUCTION: 1
COUGH: 1
PSYCHIATRIC NEGATIVE: 1

## 2019-03-03 ASSESSMENT — COGNITIVE AND FUNCTIONAL STATUS - GENERAL
CLIMB 3 TO 5 STEPS WITH RAILING: A LITTLE
MOBILITY SCORE: 21
WALKING IN HOSPITAL ROOM: A LITTLE
SUGGESTED CMS G CODE MODIFIER MOBILITY: CJ
STANDING UP FROM CHAIR USING ARMS: A LITTLE

## 2019-03-03 ASSESSMENT — LIFESTYLE VARIABLES: EVER_SMOKED: YES

## 2019-03-03 ASSESSMENT — COPD QUESTIONNAIRES
COPD SCREENING SCORE: 4
HAVE YOU SMOKED AT LEAST 100 CIGARETTES IN YOUR ENTIRE LIFE: YES
DO YOU EVER COUGH UP ANY MUCUS OR PHLEGM?: NO/ONLY WITH OCCASIONAL COLDS OR INFECTIONS
DURING THE PAST 4 WEEKS HOW MUCH DID YOU FEEL SHORT OF BREATH: NONE/LITTLE OF THE TIME

## 2019-03-03 NOTE — CARE PLAN
Problem: Communication  Goal: The ability to communicate needs accurately and effectively will improve  Outcome: PROGRESSING AS EXPECTED  Patient able to make her needs known, calls appropriately    Problem: Pain Management  Goal: Pain level will decrease to patient's comfort goal  Outcome: PROGRESSING AS EXPECTED  Patient pain relieved by prn topical pain medication

## 2019-03-03 NOTE — PROGRESS NOTES
[facility] INTERNAL MEDICINE ATTENDING NOTE:      Date & Time note created:   3/3/2019   2:54 PM     Visit Time:   Attending/resident bedside rounds 9-11:30 AM    I saw and evaluated the patient with the resident staff.  This is an attending attendum note, please reference resident daily note for complete information.The chart was reviewed and summarized. Available labs, imaging, O2 sats, EKGs were reviewed. Available nursing, consultant and resident notes were reviewed. I am actively involved in the patient's care.                                                                BRIEF DISCUSSION:                                                         73 y/f presenting with SOB and Cough. Symptoms and imaging c/w PNA. Her symptoms are improving on Ceftriaxone and Azithro. LFT slightly abnormal ? Related to C3. She was also found to have sclerotic bony lesions with exam finding of suspicious breast nodule. Breast US and Mammogram cannot be done as inpatient. Will get Bone scan to see if the bone lesions are hot. ? Bone Bx as the yield is generally low. Will need good oncology follow up before discharge. Patient is from [facility].     Active Hospital Problems    Diagnosis   • Pneumonia [J18.9]     Priority: High   • Malignancy (HCC) [C80.1]     Priority: High   • Hypokalemia [E87.6]     Priority: Medium   • Diarrhea [R19.7]     Priority: Medium   • Acute kidney injury (HCC) [N17.9]     Priority: Medium   • Microscopic colitis [K52.839]     Priority: Medium   • Hypercalcemia [E83.52]     Priority: Low   • Hypothyroidism [E03.9]     Priority: Low   • Bipolar disorder (HCC) [F31.9]     Priority: Low   • Metabolic acidosis [E87.2]   • Debility [R53.81]   • Rash [R21]       Vitals:    03/03/19 0057 03/03/19 0559 03/03/19 0901 03/03/19 1317   BP: 135/49 115/63 127/70 118/61   Pulse: 76 85 71 88   Resp: 16 18 18 18   Temp: 36.1 °C (97 °F) 36.6 °C (97.8 °F) 36.9 °C (98.5 °F) 36.6 °C (97.8 °F)   TempSrc: Temporal Temporal  Temporal Temporal   SpO2: 93% 90% 94% 98%   Weight:       Height:         Weight/BMI: Body mass index is 24.36 kg/m².  Pulse Oximetry: 98 %, O2 (LPM): 0, O2 Delivery: None (Room Air)    Intake/Output Summary (Last 24 hours) at 03/03/19 1454  Last data filed at 03/03/19 0356   Gross per 24 hour   Intake              640 ml   Output                0 ml   Net              640 ml       Emily Alexander MD   [facility] Hospitalist       Voice recognition software was used to generate this note, hence there may be some errors with word recognition despite all efforts to minimize them.

## 2019-03-03 NOTE — PROGRESS NOTES
Assumed care of patient, bedside report received from Luz Maria. Updated on POC, call light within reach and fall precautions in place. Bed locked and in lowest position. Patient instructed to call for assistance before getting out of bed. All questions answered, no other needs at this time.

## 2019-03-03 NOTE — THERAPY
"Physical Therapy Evaluation completed.   Bed Mobility:  Supine to Sit: Stand by Assist (HOB flat, no rail)  Transfers: Sit to Stand: Stand by Assist  Gait: Level Of Assist: Contact Guard Assist with No Equipment Needed       Plan of Care: Will benefit from Physical Therapy 3 times per week  Discharge Recommendations: Equipment: Will Continue to Assess for Equipment Needs. Post-acute therapy: see below.    See \"Rehab Therapy-Acute\" Patient Summary Report for complete documentation.    Patient is a 74 YO female that was admitted 3/1 from outside facility following complaints of nausea, vomiting, diarrhea, abdominal pain x3 days, SOB, malaise/fatigue. Imaging of chest and abdomen revealed pneumonia, hiatal hernia, and malignant neoplasm to bone. Patient with PMHx significant for hypothyroidism, BPD, colitis. Patient presented to PT with impaired balance, impaired insight and safety awareness, and decreased activity tolerance. Patient requested use of toilet then ambulated approximately 250ft without AD with CGA, patient demonstrated several moderate LOB with ability to self correct, patient attributed impaired balance to lack of sleep and grogginess. Concern regarding fall risk given malignant bony lesions, patient pending bone scan. Currently recommend post acute placement though patient may progress to level for safe DC home during acute stay. Will continue to follow.  "

## 2019-03-03 NOTE — PROGRESS NOTES
Bedside report received from day shift RN. Patient awake, alert and oriented x 4. Sitting on edge of bed. On room air. No complaints noted at this time. Appropriate fall precautions in place. Call light within reach. Will continue to monitor.

## 2019-03-04 ENCOUNTER — PATIENT OUTREACH (OUTPATIENT)
Dept: HEALTH INFORMATION MANAGEMENT | Facility: OTHER | Age: 74
End: 2019-03-04

## 2019-03-04 VITALS
HEART RATE: 86 BPM | WEIGHT: 170.86 LBS | HEIGHT: 70 IN | OXYGEN SATURATION: 94 % | BODY MASS INDEX: 24.46 KG/M2 | TEMPERATURE: 98.6 F | DIASTOLIC BLOOD PRESSURE: 59 MMHG | SYSTOLIC BLOOD PRESSURE: 155 MMHG | RESPIRATION RATE: 18 BRPM

## 2019-03-04 PROBLEM — N17.9 ACUTE KIDNEY INJURY (HCC): Status: RESOLVED | Noted: 2019-03-01 | Resolved: 2019-03-04

## 2019-03-04 PROBLEM — E83.52 HYPERCALCEMIA: Status: RESOLVED | Noted: 2019-03-01 | Resolved: 2019-03-04

## 2019-03-04 LAB
ALBUMIN SERPL BCP-MCNC: 3.5 G/DL (ref 3.2–4.9)
ALBUMIN/GLOB SERPL: 1.8 G/DL
ALP SERPL-CCNC: 108 U/L (ref 30–99)
ALT SERPL-CCNC: 54 U/L (ref 2–50)
ANION GAP SERPL CALC-SCNC: 9 MMOL/L (ref 0–11.9)
AST SERPL-CCNC: 29 U/L (ref 12–45)
BACTERIA SPEC RESP CULT: NORMAL
BASOPHILS # BLD AUTO: 0.4 % (ref 0–1.8)
BASOPHILS # BLD: 0.06 K/UL (ref 0–0.12)
BILIRUB SERPL-MCNC: 0.4 MG/DL (ref 0.1–1.5)
BUN SERPL-MCNC: 13 MG/DL (ref 8–22)
CALCIUM SERPL-MCNC: 9.6 MG/DL (ref 8.5–10.5)
CHLORIDE SERPL-SCNC: 117 MMOL/L (ref 96–112)
CO2 SERPL-SCNC: 15 MMOL/L (ref 20–33)
CREAT SERPL-MCNC: 0.71 MG/DL (ref 0.5–1.4)
EOSINOPHIL # BLD AUTO: 0.21 K/UL (ref 0–0.51)
EOSINOPHIL NFR BLD: 1.5 % (ref 0–6.9)
ERYTHROCYTE [DISTWIDTH] IN BLOOD BY AUTOMATED COUNT: 44.7 FL (ref 35.9–50)
GLOBULIN SER CALC-MCNC: 2 G/DL (ref 1.9–3.5)
GLUCOSE SERPL-MCNC: 118 MG/DL (ref 65–99)
GRAM STN SPEC: NORMAL
HCT VFR BLD AUTO: 37.6 % (ref 37–47)
HGB BLD-MCNC: 11.8 G/DL (ref 12–16)
IMM GRANULOCYTES # BLD AUTO: 0.2 K/UL (ref 0–0.11)
IMM GRANULOCYTES NFR BLD AUTO: 1.4 % (ref 0–0.9)
LYMPHOCYTES # BLD AUTO: 1.8 K/UL (ref 1–4.8)
LYMPHOCYTES NFR BLD: 12.6 % (ref 22–41)
MCH RBC QN AUTO: 29.6 PG (ref 27–33)
MCHC RBC AUTO-ENTMCNC: 31.4 G/DL (ref 33.6–35)
MCV RBC AUTO: 94.5 FL (ref 81.4–97.8)
MONOCYTES # BLD AUTO: 0.67 K/UL (ref 0–0.85)
MONOCYTES NFR BLD AUTO: 4.7 % (ref 0–13.4)
NEUTROPHILS # BLD AUTO: 11.32 K/UL (ref 2–7.15)
NEUTROPHILS NFR BLD: 79.4 % (ref 44–72)
NRBC # BLD AUTO: 0 K/UL
NRBC BLD-RTO: 0 /100 WBC
PLATELET # BLD AUTO: 239 K/UL (ref 164–446)
PMV BLD AUTO: 9.9 FL (ref 9–12.9)
POTASSIUM SERPL-SCNC: 3.5 MMOL/L (ref 3.6–5.5)
PROT SERPL-MCNC: 5.5 G/DL (ref 6–8.2)
RBC # BLD AUTO: 3.98 M/UL (ref 4.2–5.4)
SIGNIFICANT IND 70042: NORMAL
SITE SITE: NORMAL
SODIUM SERPL-SCNC: 141 MMOL/L (ref 135–145)
SOURCE SOURCE: NORMAL
WBC # BLD AUTO: 14.3 K/UL (ref 4.8–10.8)

## 2019-03-04 PROCEDURE — 700102 HCHG RX REV CODE 250 W/ 637 OVERRIDE(OP): Performed by: STUDENT IN AN ORGANIZED HEALTH CARE EDUCATION/TRAINING PROGRAM

## 2019-03-04 PROCEDURE — A9270 NON-COVERED ITEM OR SERVICE: HCPCS | Performed by: STUDENT IN AN ORGANIZED HEALTH CARE EDUCATION/TRAINING PROGRAM

## 2019-03-04 PROCEDURE — 700105 HCHG RX REV CODE 258: Performed by: STUDENT IN AN ORGANIZED HEALTH CARE EDUCATION/TRAINING PROGRAM

## 2019-03-04 PROCEDURE — 36415 COLL VENOUS BLD VENIPUNCTURE: CPT

## 2019-03-04 PROCEDURE — 99239 HOSP IP/OBS DSCHRG MGMT >30: CPT | Mod: GC | Performed by: HOSPITALIST

## 2019-03-04 PROCEDURE — 700111 HCHG RX REV CODE 636 W/ 250 OVERRIDE (IP): Performed by: STUDENT IN AN ORGANIZED HEALTH CARE EDUCATION/TRAINING PROGRAM

## 2019-03-04 PROCEDURE — 85025 COMPLETE CBC W/AUTO DIFF WBC: CPT

## 2019-03-04 PROCEDURE — 80053 COMPREHEN METABOLIC PANEL: CPT

## 2019-03-04 RX ORDER — AMOXICILLIN AND CLAVULANATE POTASSIUM 875; 125 MG/1; MG/1
1 TABLET, FILM COATED ORAL EVERY 12 HOURS
Status: DISCONTINUED | OUTPATIENT
Start: 2019-03-04 | End: 2019-03-04 | Stop reason: HOSPADM

## 2019-03-04 RX ORDER — POTASSIUM CHLORIDE 20 MEQ/1
40 TABLET, EXTENDED RELEASE ORAL ONCE
Status: DISCONTINUED | OUTPATIENT
Start: 2019-03-04 | End: 2019-03-04 | Stop reason: HOSPADM

## 2019-03-04 RX ORDER — AMOXICILLIN AND CLAVULANATE POTASSIUM 875; 125 MG/1; MG/1
1 TABLET, FILM COATED ORAL EVERY 12 HOURS
Qty: 28 TAB | Refills: 0 | Status: SHIPPED | OUTPATIENT
Start: 2019-03-04 | End: 2019-03-18

## 2019-03-04 RX ORDER — BENZONATATE 100 MG/1
100 CAPSULE ORAL 3 TIMES DAILY PRN
Qty: 60 CAP | Refills: 0 | Status: SHIPPED | OUTPATIENT
Start: 2019-03-04 | End: 2019-03-19

## 2019-03-04 RX ORDER — AZITHROMYCIN 500 MG/1
500 TABLET, FILM COATED ORAL DAILY
Qty: 2 TAB | Refills: 0 | Status: SHIPPED | OUTPATIENT
Start: 2019-03-04 | End: 2019-03-06

## 2019-03-04 RX ORDER — SODIUM BICARBONATE 650 MG/1
650 TABLET ORAL 2 TIMES DAILY
Status: DISCONTINUED | OUTPATIENT
Start: 2019-03-04 | End: 2019-03-04 | Stop reason: HOSPADM

## 2019-03-04 RX ORDER — GUAIFENESIN/DEXTROMETHORPHAN 100-10MG/5
10 SYRUP ORAL EVERY 4 HOURS PRN
Qty: 840 ML | Refills: 0 | Status: SHIPPED | OUTPATIENT
Start: 2019-03-04 | End: 2019-03-11

## 2019-03-04 RX ORDER — SODIUM BICARBONATE 650 MG/1
650 TABLET ORAL 2 TIMES DAILY
Qty: 14 TAB | Refills: 0 | Status: SHIPPED | OUTPATIENT
Start: 2019-03-04 | End: 2019-03-11

## 2019-03-04 RX ADMIN — PREGABALIN 75 MG: 75 CAPSULE ORAL at 05:51

## 2019-03-04 RX ADMIN — DIPHENHYDRAMINE HCL 25 MG: 25 TABLET ORAL at 12:36

## 2019-03-04 RX ADMIN — ENOXAPARIN SODIUM 40 MG: 100 INJECTION SUBCUTANEOUS at 05:52

## 2019-03-04 RX ADMIN — SODIUM BICARBONATE 650 MG: 650 TABLET ORAL at 13:16

## 2019-03-04 RX ADMIN — CEFTRIAXONE SODIUM 1 G: 1 INJECTION, POWDER, FOR SOLUTION INTRAMUSCULAR; INTRAVENOUS at 05:52

## 2019-03-04 RX ADMIN — SODIUM CHLORIDE: 9 INJECTION, SOLUTION INTRAVENOUS at 03:48

## 2019-03-04 RX ADMIN — LEVOTHYROXINE SODIUM 25 MCG: 25 TABLET ORAL at 05:51

## 2019-03-04 RX ADMIN — GUAIFENESIN AND DEXTROMETHORPHAN 10 ML: 100; 10 SYRUP ORAL at 10:26

## 2019-03-04 RX ADMIN — CHOLECALCIFEROL (VITAMIN D3) 10 MCG (400 UNIT) TABLET 400 UNITS: at 05:51

## 2019-03-04 RX ADMIN — LAMOTRIGINE 200 MG: 100 TABLET ORAL at 05:51

## 2019-03-04 RX ADMIN — POTASSIUM CHLORIDE 40 MEQ: 1500 TABLET, EXTENDED RELEASE ORAL at 05:51

## 2019-03-04 NOTE — PROGRESS NOTES
Internal Medicine Interval Note  Note Author: Rosina Clark M.D.     Name Sherine Hahn 1945   Age/Sex 73 y.o. female   MRN [id number]   Code Status DNAR/DNI     After 5PM or if no immediate response to page, please call for cross-coverage  Attending/Team:  See Patient List for primary contact information  Call [phone] to page    1st Call - Day Intern (R1):   Eduardo  2nd Call - Day Sr. Resident (R2/R3):   Amilcar          Reason for interval visit  (Principal Problem)   Admitted for CAP along with sclerotic sternal and sacral lesions for further work up       Interval Problem Daily Status Update  (24 hours, problem oriented, brief subjective history, new lab/imaging data pertinent to that problem)   - Over night admit. No incidents over night, kidney functions normalizes. Diarrhea is improving, she hasnt received Loperamide. Bone scan read as mild increased uptake within the sternal lesion. This is nonspecific but could relate to metastasis.Discussed with patient that she needs Heme/Onc f/u on discharge.     Review of Systems   Constitutional: Positive for malaise/fatigue. Negative for chills, diaphoresis, fever and weight loss.   HENT: Negative.    Respiratory: Positive for cough, sputum production and shortness of breath. Negative for hemoptysis and wheezing.    Cardiovascular: Negative.    Gastrointestinal: Positive for diarrhea. Negative for blood in stool, constipation, heartburn, melena, nausea and vomiting.   Genitourinary: Negative.    Musculoskeletal: Negative.    Skin: Positive for rash. Negative for itching.   Neurological: Positive for weakness.   Psychiatric/Behavioral: Negative.        Disposition/Barriers to discharge:   Pending PT/OT final recs.     Consultants/Specialty  None  PCP: No primary care provider on file.      Quality Measures  Quality-Core Measures   Reviewed items::  Labs reviewed  Mcnally catheter::  No Mcnally  DVT prophylaxis pharmacological::   Enoxaparin (Lovenox)          Physical Exam       Vitals:    03/03/19 0901 03/03/19 1317 03/03/19 1655 03/03/19 1659   BP: 127/70 118/61  126/57   Pulse: 71 88 82 86   Resp: 18 18 16 18   Temp: 36.9 °C (98.5 °F) 36.6 °C (97.8 °F)  36.4 °C (97.6 °F)   TempSrc: Temporal Temporal  Temporal   SpO2: 94% 98% 92% 91%   Weight:       Height:         Body mass index is 24.36 kg/m². Weight: 77 kg (169 lb 12.1 oz)  Oxygen Therapy:  Pulse Oximetry: 91 %, O2 (LPM): 0, O2 Delivery: None (Room Air)    Physical Exam   Constitutional: She is oriented to person, place, and time and well-developed, well-nourished, and in no distress.   HENT:   Head: Normocephalic.   Eyes: Pupils are equal, round, and reactive to light.   Neck: Normal range of motion. Neck supple. No JVD present. No thyromegaly present.   Cardiovascular: Normal rate, regular rhythm and normal heart sounds.    Pulmonary/Chest: Effort normal. No respiratory distress. She has no wheezes.   Deminished air entry rt side. No crackles appreciated   Abdominal: Soft. Bowel sounds are normal. She exhibits no distension and no mass. There is tenderness. There is no rebound and no guarding.   Lower abdominal purpuric rash   Musculoskeletal: Normal range of motion.   Lymphadenopathy:     She has no cervical adenopathy.   Neurological: She is alert and oriented to person, place, and time. GCS score is 15.   Skin: Rash noted.   Psychiatric: Affect normal.         Assessment/Plan     * Pneumonia   Assessment & Plan    Patient has been c/o SOB and productive cough. No chills but reported fever at [facility].   SIRS 3/4 ( WBCs, HR and Tachycardia ) at [facility].    Chest exam: deminshed air entry on rt side. Couldn't appreciate added sounds on exam. 97% RA  Chest x-ray previous facility revealed 2 cm round opacity in right lung.    CT chest: Multifocal bronchopneumonia with sternal multiple sclerotic lesions.   On today assessment , sob and cough is better. Oxygen  requirement 91-92% RA. Not on oxygen at home. deminished air entry rt side.     Plan  - Continue ceftriaxone and azithromycin total of 7 days ( started on 3/1/2019)  - Continue to monitor on telemetry with cardiac  - Follow up on MRSA, Flu negative,  blood and sputum c/s NGTD  - RT protocol  - Incentive spirometry     Malignancy (HCC)   Assessment & Plan    Patient denies hx of cancer in the past, reports family hx of Non Hodgkin lymphoma with her mother. Also denies poor appetite and no wt changes.   O/E lt breast 2x2 cm mobile mass. No cervical, axially or inguinal lymph nodes appreciated, no thyromegaly or thyroid nodules. TRESA exam no bleeding or masses.   Chest x-ray revealed 2 cm round opacity in right lung.   CT chest and abdomen discussed with our radiologist, there are multiple sternal mets and sacral mets too ( scelerotic lesions )  CBC: Leucocytosis 38,000 > 28.3 > 17  Peripheral smear no abnormal cells concerning of CML, CLL or MM  US b/l breasts declined by Radiology says can be done as outpatient once patient stable for discharge.  Bone scan shows milld increased uptake within the sternal lesion. This is nonspecific but could relate to metastasis.   D.D of sclerotic bone lesions includes ( Colon cx, breast cx, Tyroid cx, Lymphoma, Iatrogenic, Bone infarcts, hemangioma, bone malignancy, Paget disease ) In her case most likely source is breast given lt breast mass 2x2 soft mobile mass.     Plan  - Records from GI consultants requested Dr.Michele Guillory requested ( Colonoscopy )  - Patient needs Heme/Onc follow up on discharge.      Hypokalemia   Assessment & Plan    Patient presented with potassium 3.4 > 3.3 on admission.  This likely secondary to diarrhea. Mg 2.0    Plan  - KCL supplements.   - CTM, BMP tomorrow     Microscopic colitis   Assessment & Plan    Chronic intermittent diarrhea secondary to microscopic colitis   Records from GI consultant Dr.Michele Guillory requested   Stool WBCs negative,  follow up on other stool work up   PRN,Loperamide if worsening need to start Budesonide.   Improving in terms of frequency      Acute kidney injury (HCC)   Assessment & Plan    BUN 20, creatinine 1.39 > 0.8 on admission per [facility] records  Secondary to dehydration which is secondary to diarrhea.   BUN/Creat ratio > 20 ( Pre-renal )  Resolved with IVF.     Plan  - Resolved with renal functions back to normal.   - Continue IV fluids for today.   - BMP tomrrow     Diarrhea   Assessment & Plan    Patient with hx of Microscopic colitis, reports having episodes of non bloody diarrhea, 2-3 time a day for the last 1-2 weeks. SIRS 3/4 on admission (sepsis secondary to PNA)   C.difficile negative in ( [facility] )  Digital rectal exam at bedside negative for occult blood ( per night team )  CBC: WBCs 28.3, HGB 13.4 > 17.5  D.D: Persistent diarrhea secondary to Gastroentritis ( viral, bacterial ), Microscopic colitis flare up, Colon/Rectal cx vs less likely C.Diff (negative at [facility])  On today assessment, less frequent diarrhea. Continues to be non bloody, she hasnt received Loperamide doses.     Plan  - Continue IV fluids and electrolyte replenishment.   - Follow up on FOBT, Stool WBCs negative C/S and Stool O&P  - PRN loperamide. If continues will add Budesonide.   - Records from GI consultants Dr.Michale Guillory requested. ( Colonoscopy records ) on 3/2/2019 and haven't received yet.          Rash   Assessment & Plan    - Pruritic rash lower abdomen and both thighs, non painful non itching noticed on exam   - Pt reports for years  - Platelets count normal.   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   -prn Benadryl for itching  - CTM      Debility   Assessment & Plan    - Debility in the setting of acute hospitalization  - PT/OT following   -  Currently recommend post acute placement though patient may progress to level for safe DC home during acute stay. Will continue to  follow her.      Metabolic acidosis   Assessment & Plan    - Secondary to diarrhea  - Resolving with IVF  - BMP tomorrow.      Bipolar disorder (HCC)   Assessment & Plan    History of bipolar disorder on lithium and lamotrigine medication.    Resume home meds   Controlled,      Hypothyroidism   Assessment & Plan    Patient has history of hypothyroidism on levothyroxine medication  Resume L.Thyroxin   TSH 1.1      Hypercalcemia   Assessment & Plan    Patient's calcium 11.7 on admission, could be likely secondary to malignancy, Dehydration.   - Corrected after IVF

## 2019-03-04 NOTE — DISCHARGE PLANNING
In the case of an emergency, pt's legal NOK ,   Lew PINZON([phone])                      H([phone])    RN Ian  met with pt at bedside and obtained the information used in this assessment. Pt verified accuracy of facesheet. Pt lives in a single story house with spouse, Lew.  Pt uses [facility] in [place], [place]. Prior to current hospitalization, pt was completely independent in ADLS/IADLS. Pt drives and is able to attend necessary MD appointments.  Pt has no financial concerns. Pt has a good support system. Pt denies/admits any hx of substance use and denies any dx of mh.    Care Transition Team Assessment    Information Source  Orientation : Oriented x 4  Information Given By: Patient  Informant's Name: Sherine Hahn    Readmission Evaluation  Is this a readmission?: No    Elopement Risk  Legal Hold: No  Ambulatory or Self Mobile in Wheelchair: Yes  Disoriented: No  Psychiatric Symptoms: None  History of Wandering: No  Elopement this Admit: No  Vocalizing Wanting to Leave: No  Displays Behaviors, Body Language Wanting to Leave: No-Not at Risk for Elopement  Elopement Risk: Not at Risk for Elopement    Interdisciplinary Discharge Planning  Does Admitting Nurse Feel This Could be a Complex Discharge?: No  Lives with - Patient's Self Care Capacity: Spouse  Patient or legal guardian wants to designate a caregiver (see row info): No  Support Systems: Children, Family Member(s), Friends / Neighbors  Housing / Facility: 1 Story House  Do You Take your Prescribed Medications Regularly: Yes  Able to Return to Previous ADL's: Yes  Mobility Issues: No  Prior Services: None  Patient Expects to be Discharged to:: Home  Assistance Needed: No  Durable Medical Equipment: Not Applicable    Discharge Preparedness  What is your plan after discharge?: Home with help  What are your discharge supports?: Child, Spouse  Prior Functional Level: Ambulatory, Drives Self, Independent with Activities of  Daily Living  Difficulity with ADLs: None  Difficulity with IADLs: None    Functional Assesment  Prior Functional Level: Ambulatory, Drives Self, Independent with Activities of Daily Living    Finances  Financial Barriers to Discharge: No    Vision / Hearing Impairment  Vision Impairment : Yes  Right Eye Vision: Wears Glasses  Left Eye Vision: Wears Glasses  Hearing Impairment : No              Domestic Abuse  Have you ever been the victim of abuse or violence?: No  Physical Abuse or Sexual Abuse: No  Verbal Abuse or Emotional Abuse: No  Possible Abuse Reported to:: Not Applicable    Psychological Assessment  History of Substance Abuse: None    Discharge Risks or Barriers  Discharge risks or barriers?: No    Anticipated Discharge Information  Anticipated discharge disposition: Home  Discharge Address: [address] [place] [address]   Discharge Contact Phone Number: [phone]

## 2019-03-04 NOTE — FLOWSHEET NOTE
Respiratory Therapy Update    Interdisciplinary Plan of Care-Goals (Indications)  Bronchodilator Indications:  (No) (03/03/19 1655)  Interdisciplinary Plan of Care-Outcomes   Hyperinflation Protocol Goals/Outcome: Absences of or Improvement in Signs of Atelectasis (03/03/19 1655)               Cough: Moist;Non Productive;Strong (03/03/19 1655)                           O2 (LPM): 0 (03/03/19 1655)  O2 Daily Delivery Respiratory : Room Air with O2 Available (03/03/19 1655)    Breath Sounds  RUL Breath Sounds: Clear (03/03/19 1655)  RML Breath Sounds: Clear (03/03/19 1655)  RLL Breath Sounds: Diminished (03/03/19 1655)  SALLIE Breath Sounds: Clear (03/03/19 1655)  LLL Breath Sounds: Diminished (03/03/19 1655)        Events/Summary/Plan: RCP done.  Pt. alert and no distress at this time.  IS done with good effort. (03/03/19 1655)

## 2019-03-04 NOTE — DISCHARGE INSTRUCTIONS
Discharge Instructions    Discharged to home by car with relative. Discharged via wheelchair, hospital escort: Yes.  Special equipment needed: Not Applicable    Be sure to schedule a follow-up appointment with your primary care doctor or any specialists as instructed.     Discharge Plan:   Influenza Vaccine Indication: Not indicated: Previously immunized this influenza season and > 8 years of age    I understand that a diet low in cholesterol, fat, and sodium is recommended for good health. Unless I have been given specific instructions below for another diet, I accept this instruction as my diet prescription.   Other diet: Regular diet    Special Instructions: None    · Is patient discharged on Warfarin / Coumadin?   No     Depression / Suicide Risk    As you are discharged from this [facility] facility, it is important to learn how to keep safe from harming yourself.    Recognize the warning signs:  · Abrupt changes in personality, positive or negative- including increase in energy   · Giving away possessions  · Change in eating patterns- significant weight changes-  positive or negative  · Change in sleeping patterns- unable to sleep or sleeping all the time   · Unwillingness or inability to communicate  · Depression  · Unusual sadness, discouragement and loneliness  · Talk of wanting to die  · Neglect of personal appearance   · Rebelliousness- reckless behavior  · Withdrawal from people/activities they love  · Confusion- inability to concentrate     If you or a loved one observes any of these behaviors or has concerns about self-harm, here's what you can do:  · Talk about it- your feelings and reasons for harming yourself  · Remove any means that you might use to hurt yourself (examples: pills, rope, extension cords, firearm)  · Get professional help from the community (Mental Health, Substance Abuse, psychological counseling)  · Do not be alone:Call your Safe Contact- someone whom you trust who will be there  for you.  · Call your local CRISIS HOTLINE 783-0624 or 147-414-5160  · Call your local Children's Mobile Crisis Response Team [place] (627) 516-8754 or www.Kidos  · Call the toll free National Suicide Prevention Hotlines   · National Suicide Prevention Lifeline 077-027-CPTE (4126)  · National PureHistory Line Network 800-SUICIDE (390-9137)

## 2019-03-04 NOTE — PROGRESS NOTES
Patient discharged home. Patient AOX 4.  IV and tele box removed, discharge instructions provided to patient and reviewed with them. All questions answered, patient provided copy of discharge instructions and instructed on when to F/U with MD. Scripts in hand. Patient wheeled off unit by CNA. Patient discharged into the care of .

## 2019-03-04 NOTE — PROGRESS NOTES
Bedside report received from day shift RN. Patient awake, alert and oriented x 4. Resting in bed. On room air. No complaints noted at this time. Patient instructed to call for assistance before getting out of bed. Call light within reach. Will continue to monitor.

## 2019-03-04 NOTE — CARE PLAN
Problem: Respiratory:  Goal: Respiratory status will improve  Outcome: PROGRESSING AS EXPECTED  Patient at baseline, on room air. Persistent cough managed with prn Robitussin and tessalon perles.    Problem: Pain Management  Goal: Pain level will decrease to patient's comfort goal  Outcome: PROGRESSING AS EXPECTED  Pain managed with prn capsaicin cream. No complaints of pain at this time.

## 2019-03-05 LAB
O+P SPEC MICRO: NORMAL
SIGNIFICANT IND 70042: NORMAL
SITE SITE: NORMAL
SOURCE SOURCE: NORMAL

## 2019-03-05 NOTE — DISCHARGE SUMMARY
Internal Medicine Discharge Summary  Note Author: Namita Rosario M.D.       Name Sherine Hahn 1945   Age/Sex 73 y.o. female   MRN [id number]         Admit Date:  3/1/2019       Discharge Date:   3/4/2019    Service:   R Internal Medicine White Team  Attending Physician(s):   Dr. Lauren       Senior Resident(s):   Dr. Rosario  Emiliano Resident(s):   Dr. Walters  PCP: Beverly Hoover M.D.      Primary Diagnosis:   Sepsis secondary to pneumonia-likely postobstructive    Secondary Diagnoses:                Principal Problem:    Pneumonia POA: Yes  Active Problems:    Malignancy (HCC) POA: Unknown    Diarrhea POA: Yes    Microscopic colitis POA: Yes    Hypokalemia POA: Unknown    Metabolic acidosis POA: Unknown    Hypothyroidism POA: Unknown    Bipolar disorder (HCC) POA: Unknown  Resolved Problems:    Acute kidney injury (HCC) POA: Unknown    Hypercalcemia POA: Unknown      Hospital Summary (Brief Narrative):       Patient is a 73-year-old male with a past medical history of microscopic colitis, hypothyroidism, anxiety, osteopenia and bipolar disorder who presents as a transfer from [facility].  Patient was seen at [facility] for complaints of nausea, vomiting, diarrhea and abdominal pain since 1 week.  Also complains of shortness of breath and cough with sputum.  Was noted to have a temperature of 102 F, WBC 30k with CXR showing 2 cm opacity in the right lung.  CT chest showed diffuse osseous metastatic disease.  He was started on ceftriaxone, vancomycin, IV fluids and transferred to [facility].    She was noted to be Sirs 3/4.  She was continued on IVF, ceftriaxone and azithromycin. CT chest showed bronchitis, multifocal bronchopneumonia with numerous sclerotic lesions in the bone.  Discussed imaging results with radiology-per radiologist, multiple sternal and sacral metastases noted.  On exam she was noted to have 2x2 cm mobile breast mass with no axillary or cervical lymphadenopathy.  Bone scan was ordered  which showed mild increased uptake within the sternal lesion, possible metastasis. Blood and sputum culture showed no growth to date.  Leukocytosis improved.  Diarrhea improved to her baseline.  Stool WBCs negative.  Pending stool cultures and ova and parasites at the time of discharge.  She was transitioned to oral antibiotics prior to discharge to complete a 5-day course of azithromycin and 14-day course of Augmentin.  She was noted to be slightly acidotic and was discharged on oral bicarb 650 bid for 1 week.  Hypokalemia was monitored and repleted.    Bilateral breast ultrasound was ordered but declined by radiology stating that it could be done as an outpatient.  Patient was scheduled for a follow-up appointment with her primary care doctor on 3/6/2019.  Instructions were given to patient to get bilateral breast ultrasound and referral from her PCP [facility].  Colonoscopy reports were requested from GI consultants which were pending at the time of discharge.    Patient /Hospital Summary (Details -- Problem Oriented) :          Malignancy (HCC)   Assessment & Plan    Patient denies hx of cancer in the past, reports family hx of Non Hodgkin lymphoma with her mother. Also denies poor appetite and no wt changes.   O/E lt breast 2x2 cm mobile mass. No cervical, axially or inguinal lymph nodes appreciated, no thyromegaly or thyroid nodules. TRESA exam no bleeding or masses.   Chest x-ray revealed 2 cm round opacity in right lung.   CT chest and abdomen discussed with our radiologist, there are multiple sternal mets and sacral mets too ( sclerotic lesions )  Peripheral smear no abnormal cells concerning of CML, CLL or MM  US b/l breasts declined by Radiology says can be done as outpatient once patient stable for discharge.  Bone scan shows milld increased uptake within the sternal lesion. This is nonspecific but could relate to metastasis.   D.D of sclerotic bone lesions includes ( Colon cx,  breast cx, Tyroid cx, Lymphoma, Iatrogenic, Bone infarcts, hemangioma, bone malignancy, Paget disease ) In her case most likely source is breast given lt breast mass 2x2 soft mobile mass.   Patient was scheduled for a follow-up appointment with her primary care doctor on 3/6/2019.  Instructions were given to patient to get bilateral breast ultrasound and referral from her PCP [facility].  Colonoscopy reports were requested from GI consultants (Dr.Michele Guillory)  which were pending at the time of discharge.     * Pneumonia   Assessment & Plan    SIRS 3/4 ( WBCs, HR and Tachycardia ) at [facility].    Chest x-ray  revealed 2 cm round opacity in right lung.    CT chest: Multifocal bronchopneumonia with sternal multiple sclerotic lesions.   Blood and sputum cx NGTD, leukocytosis improving, afebrile.  Continue azithromycin to finish a 5 day course and augmentin to finish a 14 day course  Robitussin and tessalon pearls for cough relief.     Metabolic acidosis   Assessment & Plan    Secondary to diarrhea  Discharged with a 1 week supply of sodium bicarbonate pills       Hypokalemia   Assessment & Plan    Monitored and repleted     Microscopic colitis   Assessment & Plan    Chronic intermittent diarrhea secondary to microscopic colitis   Records from GI consultant Dr.Michele Guillory requested   Stool WBCs negative, follow up on other stool work up   PRN,Loperamide if worsening need to start Budesonide.   Improving in terms of frequency      Diarrhea   Assessment & Plan    Patient with hx of Microscopic colitis  C.difficile negative  ( [facility] )  Digital rectal exam at bedside negative for occult blood , FOBT -ve  On discharge, diarrhea at baseline  Stool studies pending       Acute kidney injury (HCC)-resolved as of 3/4/2019   Assessment & Plan    BUN 20, creatinine 1.39 > 0.8 on admission per [place] records  Secondary to dehydration which is secondary to diarrhea.   BUN/Creat  ratio > 20 ( Pre-renal )  Resolved with IVF.      Rash   Assessment & Plan    Pruritic rash lower abdomen and both thighs, non painful non itching noticed on exam   Pt reports for years  Platelets count normal.   prn Benadryl for itching while inpatient       Debility   Assessment & Plan    Debility in the setting of acute hospitalization  At baseline on discharge       Bipolar disorder (HCC)   Assessment & Plan    History of bipolar disorder on lithium and lamotrigine medication.    Resume home meds   Controlled,      Hypothyroidism   Assessment & Plan    Patient has history of hypothyroidism on levothyroxine medication  Resume L.Thyroxin   TSH 1.1      Hypercalcemia-resolved as of 3/4/2019   Assessment & Plan    Patient's calcium 11.7 on admission, could be likely secondary to malignancy, Dehydration.   Corrected after IVF          Consultants:     None    Procedures:        None    Imaging/ Testing:      NM-BONE SCAN WHOLE BODY   Final Result         Mild increased uptake within the sternal lesion. This is nonspecific but could relate to metastasis.      Other lesions may be too small to demonstrate detectable uptake.      CT-CHEST (THORAX) W/O   Final Result      1. Findings are consistent with bronchitis and developing multifocal bronchopneumonia. No pleural effusions.      2. Groundglass opacities in the left lower and left upper lobes may also be related to active infection. Follow-up CT is advised in 2-3 months to document resolution.      3. Numerous sclerotic lesions throughout the visualized osseous structures, highly suspicious for osseous metastatic disease.      4. Moderate to large hiatal hernia.      5. Splenomegaly.      6. Subcentimeter right hepatic hypodensity, too small to characterize.               OUTSIDE IMAGES-CT ABDOMEN /PELVIS   Final Result      OUTSIDE IMAGES-DX CHEST   Final Result            Discharge Medications:         Medication Reconciliation: Completed       Medication List       START taking these medications      Instructions   amoxicillin-clavulanate 875-125 MG Tabs  Commonly known as:  AUGMENTIN   Take 1 Tab by mouth every 12 hours for 14 days.  Dose:  1 Tab     azithromycin 500 MG tablet  Commonly known as:  ZITHROMAX   Take 1 Tab by mouth every day for 2 days.  Dose:  500 mg     benzonatate 100 MG Caps  Commonly known as:  TESSALON   Take 1 Cap by mouth 3 times a day as needed for Cough for up to 15 days.  Dose:  100 mg     guaiFENesin dextromethorphan 100-10 MG/5ML Syrp syrup  Commonly known as:  ROBITUSSIN DM   Take 10 mL by mouth every four hours as needed for Cough for up to 7 days.  Dose:  10 mL     sodium bicarbonate 650 MG Tabs  Commonly known as:  SODIUM BICARBONATE   Take 1 Tab by mouth 2 times a day for 7 days.  Dose:  650 mg        CONTINUE taking these medications      Instructions   Clobetasol Propionate 0.05 % Sham   1 Application by Apply externally route 1 time daily as needed (scalp).  Dose:  1 Application     diphenoxylate-atropine 2.5-0.025 MG Tabs  Commonly known as:  LOMOTIL   Take 1 Tab by mouth 2 Times a Day.  Dose:  1 Tab     IRON PO   Take 1 Tab by mouth every morning.  Dose:  1 Tab     lamotrigine 200 MG tablet  Commonly known as:  LAMICTAL   Take 200 mg by mouth 2 Times a Day.  Dose:  200 mg     levothyroxine 25 MCG Tabs  Commonly known as:  SYNTHROID   Take 25 mcg by mouth Every morning on an empty stomach.  Dose:  25 mcg     lithium  MG Tbcr  Commonly known as:  ESKALITH CR   Take 450 mg by mouth every bedtime.  Dose:  450 mg     MULTIVITAMIN PO   Take 1 Tab by mouth every morning.  Dose:  1 Tab     pregabalin 75 MG Caps  Commonly known as:  LYRICA   Take 75 mg by mouth 2 Times a Day.  Dose:  75 mg     QUEtiapine 25 MG Tabs  Commonly known as:  SEROQUEL   Take 12.5 mg by mouth every day.  Dose:  12.5 mg     VITAMIN D PO   Take 1 Tab by mouth every morning.  Dose:  1 Tab     VITAMIN E PO   Take 1 Tab by mouth every morning.  Dose:  1 Tab     zolpidem  12.5 MG CR tablet  Commonly known as:  MISAELIEN CR   Take 12.5 mg by mouth at bedtime as needed for Sleep.  Dose:  12.5 mg              Disposition:   Home    Diet:   Healthy diet    Activity:   As tolerated    Instructions:         The patient was instructed to return to the ER in the event of worsening symptoms. I have counseled the patient on the importance of compliance and the patient has agreed to proceed with all medical recommendations and follow up plan indicated above.   The patient understands that all medications come with benefits and risks. Risks may include permanent injury or death and these risks can be minimized with close reassessment and monitoring.        Primary Care Provider:    Beverly Hoover M.D.    Discharge summary faxed to primary care provider:  Deferred  Copy of discharge summary given to the patient: Deferred      Follow up appointment details :      No future appointments.  Beverly Hoover M.D.  [address] [facility] [address]  [phone]    Go on 3/6/2019  PLEASE ARRIVE AT 2:00PM FOR YOUR APPOINTMENT. THIS APPOINTMENT WILL BE WITH CHANELL RUEDA. THANK YOU **BE SURE TO HAVE PCP ORDER BREAST US AND SEND A REFERRAL TO HEM/ONC AS DISCUSSED.**      Pending Studies:        Stool culture and sensitivity  Stool ova and parasites  Colonoscopy reports from GI consultants    Time spent on discharge day patient visit, preparing discharge paperwork and arranging for patient follow up.    Summary of follow up issues:   Please f/u with PCP on 3/6/2019  You will need a bilateral breat ultrasound ordered by your PCP to evaluate your breast mass.  You will need a referral to [facility] from your PCP to evaluate the bone lesions.    Discharge Time (Minutes) :    45mins  Hospital Course Type:  Inpatient Stay >2 midnights      Condition on Discharge    ______________________________________________________________________    Interval history/exam for day of discharge:    Patient  c/o cough but denies any other complaints  Reports improvement in her diarrhea to her baseline  Discharge plan explained to patient and verbalized understanding      Most Recent Labs:    Lab Results   Component Value Date/Time    WBC 14.3 (H) 03/04/2019 12:09 AM    RBC 3.98 (L) 03/04/2019 12:09 AM    HEMOGLOBIN 11.8 (L) 03/04/2019 12:09 AM    HEMATOCRIT 37.6 03/04/2019 12:09 AM    MCV 94.5 03/04/2019 12:09 AM    MCH 29.6 03/04/2019 12:09 AM    MCHC 31.4 (L) 03/04/2019 12:09 AM    MPV 9.9 03/04/2019 12:09 AM    NEUTSPOLYS 79.40 (H) 03/04/2019 12:09 AM    LYMPHOCYTES 12.60 (L) 03/04/2019 12:09 AM    MONOCYTES 4.70 03/04/2019 12:09 AM    EOSINOPHILS 1.50 03/04/2019 12:09 AM    BASOPHILS 0.40 03/04/2019 12:09 AM      Lab Results   Component Value Date/Time    SODIUM 141 03/04/2019 12:09 AM    POTASSIUM 3.5 (L) 03/04/2019 12:09 AM    CHLORIDE 117 (H) 03/04/2019 12:09 AM    CO2 15 (L) 03/04/2019 12:09 AM    GLUCOSE 118 (H) 03/04/2019 12:09 AM    BUN 13 03/04/2019 12:09 AM    CREATININE 0.71 03/04/2019 12:09 AM      Lab Results   Component Value Date/Time    ALTSGPT 54 (H) 03/04/2019 12:09 AM    ASTSGOT 29 03/04/2019 12:09 AM    ALKPHOSPHAT 108 (H) 03/04/2019 12:09 AM    TBILIRUBIN 0.4 03/04/2019 12:09 AM    ALBUMIN 3.5 03/04/2019 12:09 AM    GLOBULIN 2.0 03/04/2019 12:09 AM    INR 1.49 (H) 03/01/2019 11:45 PM     Lab Results   Component Value Date/Time    PROTHROMBTM 18.1 (H) 03/01/2019 11:45 PM    INR 1.49 (H) 03/01/2019 11:45 PM

## 2019-03-06 LAB
BACTERIA STL CULT: NORMAL
SIGNIFICANT IND 70042: NORMAL
SITE SITE: NORMAL
SOURCE SOURCE: NORMAL

## 2019-03-12 ENCOUNTER — TELEPHONE (OUTPATIENT)
Dept: HEMATOLOGY ONCOLOGY | Facility: MEDICAL CENTER | Age: 74
End: 2019-03-12

## 2019-03-12 NOTE — TELEPHONE ENCOUNTER
Spoke with the patient and advised her that we are not contracted with her insurance. Referring provider aleksandars been notified.